# Patient Record
Sex: FEMALE | Race: BLACK OR AFRICAN AMERICAN | Employment: FULL TIME | URBAN - METROPOLITAN AREA
[De-identification: names, ages, dates, MRNs, and addresses within clinical notes are randomized per-mention and may not be internally consistent; named-entity substitution may affect disease eponyms.]

---

## 2017-04-21 DIAGNOSIS — Z00.00 ROUTINE GENERAL MEDICAL EXAMINATION AT A HEALTH CARE FACILITY: Primary | ICD-10-CM

## 2017-04-21 LAB
A-G RATIO,AGRAT: 1.9 RATIO (ref 1.1–2.6)
ALBUMIN SERPL-MCNC: 4.1 G/DL (ref 3.5–5)
ALP SERPL-CCNC: 48 U/L (ref 25–115)
ALT SERPL-CCNC: 13 U/L (ref 5–40)
ANION GAP SERPL CALC-SCNC: 17 MMOL/L
AST SERPL W P-5'-P-CCNC: 17 U/L (ref 10–37)
BILIRUB SERPL-MCNC: <0.1 MG/DL (ref 0.2–1.2)
BUN SERPL-MCNC: 17 MG/DL (ref 6–22)
CALCIUM SERPL-MCNC: 9 MG/DL (ref 8.4–10.5)
CHLORIDE SERPL-SCNC: 98 MMOL/L (ref 98–110)
CHOLEST SERPL-MCNC: 188 MG/DL (ref 110–200)
CO2 SERPL-SCNC: 25 MMOL/L (ref 20–32)
CREAT SERPL-MCNC: 0.9 MG/DL (ref 0.5–1.2)
ERYTHROCYTE [DISTWIDTH] IN BLOOD BY AUTOMATED COUNT: 13.4 % (ref 10–16)
GFRAA, 66117: >60
GFRNA, 66118: >60
GLOBULIN,GLOB: 2.2 G/DL (ref 2–4)
GLUCOSE SERPL-MCNC: 79 MG/DL (ref 65–99)
HCT VFR BLD AUTO: 43.7 % (ref 35.1–48)
HDLC SERPL-MCNC: 75 MG/DL (ref 40–59)
HGB BLD-MCNC: 14.3 G/DL (ref 11.7–16)
LDLC SERPL CALC-MCNC: 100 MG/DL (ref 50–99)
MCH RBC QN AUTO: 35 PG (ref 26–34)
MCHC RBC AUTO-ENTMCNC: 33 G/DL (ref 32–36)
MCV RBC AUTO: 107 FL (ref 80–95)
PLATELET # BLD AUTO: 188 K/UL (ref 140–440)
PMV BLD AUTO: 10.6 FL (ref 6–10.8)
POTASSIUM SERPL-SCNC: 3.6 MMOL/L (ref 3.5–5.5)
PROT SERPL-MCNC: 6.3 G/DL (ref 6.4–8.3)
RBC # BLD AUTO: 4.1 M/UL (ref 3.8–5.2)
SODIUM SERPL-SCNC: 140 MMOL/L (ref 133–145)
TRIGL SERPL-MCNC: 66 MG/DL (ref 40–149)
VLDLC SERPL CALC-MCNC: 13 MG/DL (ref 8–30)
WBC # BLD AUTO: 10.4 K/UL (ref 4–11)

## 2017-04-24 ENCOUNTER — TELEPHONE (OUTPATIENT)
Dept: FAMILY MEDICINE CLINIC | Age: 47
End: 2017-04-24

## 2017-04-24 DIAGNOSIS — D75.89 MACROCYTOSIS WITHOUT ANEMIA: Primary | ICD-10-CM

## 2017-05-01 ENCOUNTER — OFFICE VISIT (OUTPATIENT)
Dept: FAMILY MEDICINE CLINIC | Age: 47
End: 2017-05-01

## 2017-05-01 VITALS
OXYGEN SATURATION: 98 % | RESPIRATION RATE: 16 BRPM | HEIGHT: 64 IN | TEMPERATURE: 98.1 F | HEART RATE: 108 BPM | SYSTOLIC BLOOD PRESSURE: 110 MMHG | WEIGHT: 132 LBS | DIASTOLIC BLOOD PRESSURE: 66 MMHG | BODY MASS INDEX: 22.53 KG/M2

## 2017-05-01 DIAGNOSIS — N76.0 RECURRENT VAGINITIS: ICD-10-CM

## 2017-05-01 DIAGNOSIS — Z00.00 ROUTINE GENERAL MEDICAL EXAMINATION AT A HEALTH CARE FACILITY: Primary | ICD-10-CM

## 2017-05-01 DIAGNOSIS — L50.9 HIVES: ICD-10-CM

## 2017-05-01 DIAGNOSIS — Z01.419 WELL FEMALE EXAM WITH ROUTINE GYNECOLOGICAL EXAM: ICD-10-CM

## 2017-05-01 DIAGNOSIS — Z12.39 SCREENING FOR BREAST CANCER: ICD-10-CM

## 2017-05-01 DIAGNOSIS — Z72.0 TOBACCO USE: ICD-10-CM

## 2017-05-01 RX ORDER — METRONIDAZOLE 7.5 MG/G
1 GEL VAGINAL
Qty: 24 TUBE | Refills: 1 | Status: SHIPPED | OUTPATIENT
Start: 2017-05-01 | End: 2018-06-03 | Stop reason: SDUPTHER

## 2017-05-01 RX ORDER — BUPROPION HYDROCHLORIDE 150 MG/1
150 TABLET ORAL
Qty: 30 TAB | Refills: 0 | Status: SHIPPED | OUTPATIENT
Start: 2017-05-01 | End: 2017-09-02 | Stop reason: SDUPTHER

## 2017-05-01 NOTE — PROGRESS NOTES
Mali Ragland is a 40 y.o. female here today for physical.        1. Have you been to the ER, urgent care clinic since your last visit? Hospitalized since your last visit? No    2. Have you seen or consulted any other health care providers outside of the [address] [place] [address] since your last visit? Include any pap smears or colon screening.  No

## 2017-05-01 NOTE — PATIENT INSTRUCTIONS
Learning About Pap Tests  What is a Pap test?  The Pap test (also called a Pap smear) is a screening test for cancer of the cervix, which is the lower part of the uterus that opens into the vagina. The test can help your doctor find early changes in the cells that could lead to cancer. During the test, the doctor or nurse will insert a tool called a speculum into your vagina. The speculum gently spreads apart the vaginal walls. That allows your doctor to see inside the vagina and the cervix. He or she uses a cotton swab or brush to collect cell samples from your cervix. Try to schedule the test when you're not having your period. To get ready for a Pap test, avoid douches, tampons, vaginal medicines, sprays, or powders for at least a day before you have the test.  When should you have a Pap test?  Women should start having Pap tests at age 24. If you are younger than 24 and are sexually active, it's still a good idea to have regular testing for sexually transmitted infections. · Women 21 to 30 should have Pap tests every 3 years. · Women 30 to 72 may continue to have a Pap test every 3 years as long as their results are normal. Or they can choose to have a Pap test and an HPV test. This is called co-testing. With co-testing, women can have screening every 5 years as long as their test results are normal.  · Women 72 and older may no longer need Pap tests. When to stop having Pap tests depends on your medical history, your overall health, and your risk of cervical cell changes or cervical cancer. Talk with your doctor about whether you should stop or continue to have Pap tests. He or she can help you decide. These recommendations do not apply to women who have had a serious abnormal Pap test result or who have certain health problems. Talk to your doctor about how often you should be tested. There is also a newer test called a primary HPV test. It is used in women ages 22 and older.  And it is done every 3 years, as long as a woman's test results are normal. A woman who has this test doesn't need to have a Pap test.  Having the HPV vaccine does not change your need for screening tests. Women who have had the HPV vaccine should follow the same screening schedules as women who have not had the vaccine. What happens after the test?  The sample of cells taken during your test will be sent to a lab so that an expert can look at the cells. It usually takes a week or two to get the results back. · A normal result means that the test did not find any abnormal cells in the sample. · An abnormal result can mean many things. Most of these are not cancer. The results of your test may be abnormal because:  ¨ You have an infection of the vagina or cervix, such as a yeast infection. ¨ You have an IUD (intrauterine device for birth control). ¨ You have low estrogen levels after menopause that are causing the cells to change. ¨ You have cell changes that may be a sign of precancer or cancer. The results are ranked based on how serious the changes might be. Where can you learn more? Go to http://sneha-carlota.info/. Enter P919 in the search box to learn more about \"Learning About Pap Tests. \"  Current as of: July 26, 2016  Content Version: 11.2  © 4242-6610 [employer], frooly. Care instructions adapted under license by [employer] (which disclaims liability or warranty for this information). If you have questions about a medical condition or this instruction, always ask your healthcare professional. [place] [address] any warranty or liability for your use of this information.

## 2017-05-01 NOTE — PROGRESS NOTES
Assessment/Plan:    1. Routine general medical examination at a health care facility    2. Well female exam with routine gynecological exam  - PAP IG, RFX APTIMA HPV ASCUS ([id number]))    3. Tobacco use- had side effects with NRT and chantix  - buPROPion XL (WELLBUTRIN XL) 150 mg tablet; Take 1 Tab by mouth every morning. Dispense: 30 Tab; Refill: 0    4. Hives  -intol of singulair.  - REFERRAL TO ALLERGY    5. Recurrent vaginitis  -flagyl suppressive rx x 6mos    6. Screening for breast cancer  - [facility] MAMMO BI SCREENING INCL CAD; Future    The plan was discussed with the patient. The patient verbalized understanding and is in agreement with the plan. All medication potential side effects were discussed with the patient. Health Maintenance:   Health Maintenance   Topic Date Due    Pneumococcal 19-64 Medium Risk (1 of 1 - PPSV23) 08/04/1990    DTaP/Tdap/Td series (1 - Tdap) 08/04/1992    PAP AKA CERVICAL CYTOLOGY  12/13/2016    INFLUENZA AGE 9 TO ADULT  08/01/2017     Jumana Castellanos is a 40 y.o. female and presents with Physical     Subjective:  Here for physical.  Feels well. Pt states she has been getting hives. She can't identify a trigger. Is being treated with prednisone by the [facility]. This has been happening since 11/2016. She takes benadryl qhs. Intol of singulair. Pt is interested in stopping smoking. Has used NRT, chantix in the past.  Plans for a quit date of 8/2017. Notes when she has sex, she has fishy odor afterward. She notices it more the week before her menses and the week after. ROS:  Constitutional: No recent weight change. No weakness/fatigue. No f/c. Skin: + rashes, no change in nails/hair, +itching   HENT: No HA, dizziness. No hearing loss/tinnitus. No nasal congestion/discharge. Eyes: No change in vision, double/blurred vision or eye pain/redness. Cardiovascular: No CP/palpitations. No FERRER/orthopnea/PND. Respiratory: No cough/sputum, dyspnea, wheezing. Gastointestinal: No dysphagia, reflux. No n/v. No constipation/diarrhea. No melena/rectal bleeding. Genitourinary: No dysuria, urinary hesitancy, nocturia, hematuria. No incontinence. Musculoskeletal: No joint pain/stiffness. No muscle pain/tenderness. Endo: No heat/cold intolerance, no polyuria/polydypsia. Heme: No h/o anemia. No easy bleeding/bruising. Allergy/Immunology: No seasonal rhinitis. Denies frequent colds, sinus/ear infections. Neurological: No seizures/numbness/weakness. No paresthesias. Psychiatric:  No depression, anxiety. The problem list was updated as a part of today's visit. Patient Active Problem List   Diagnosis Code    Insomnia G47.00    History of shingles Z86.19    Macrocytosis without anemia D75.89       The PSH, FH were reviewed. SH:  Social History   Substance Use Topics    Smoking status: Current Every Day Smoker     Packs/day: 0.50     Years: 23.00    Smokeless tobacco: None    Alcohol use Yes       Medications/Allergies:    Current Outpatient Prescriptions:     acetaminophen (TYLENOL) 325 mg tablet, Take  by mouth every four (4) hours as needed. , Disp: , Rfl:     CETIRIZINE HCL/PSEUDOEPHEDRINE (ZYRTEC-D PO), Take  by mouth., Disp: , Rfl:     ASPIRIN/ACETAMINOPHEN/CAFFEINE (EXCEDRIN MIGRAINE PO), Take  by mouth., Disp: , Rfl:     ibuprofen (MOTRIN) 600 mg tablet, Take  by mouth every six (6) hours as needed. , Disp: , Rfl:        Allergies   Allergen Reactions    Codeine Itching    Compazine [Prochlorperazine] Anaphylaxis    Morphine Nausea and Vomiting    Phenergan [Promethazine] Anaphylaxis       Objective:  Visit Vitals    /66    Pulse (!) 108    Temp 98.1 °F (36.7 °C)    Resp 16    Ht 5' 4\" (1.626 m)    Wt 132 lb (59.9 kg)    LMP 04/11/2017    SpO2 98%    BMI 22.66 kg/m2      Constitutional: Well developed, nourished, no distress, alert   HENT: Exterior ears and tympanic membranes normal bilaterally. Supple neck.  No thyromegaly or lymphadenopathy. Oropharynx clear and moist mucous membranes. Eyes: Conjunctiva normal. PERRL. CV: S1, S2.  RRR. No murmurs/rubs. No thrills palpated. No carotid bruits. Intact distal pulses. No edema. Pulm: No abnormalities on inspection. Clear to auscultation bilaterally. No wheezing/rhonchi. Normal effort. GI: Soft, nontender, nondistended. Normal active bowel sounds. MS: Gait normal.  Joints without deformity/tenderness. Strength intact bilateral upper and lower ext. Normal ROM all extremities. Neuro: A/O x 3. No focal motor or sensory deficits. Speech normal.   Skin: No lesions/rashes on inspection. Psych: Appropriate affect, judgement and insight. Short-term memory intact. Pelvic exam: normal external genitalia, vulva, vagina, cervix, uterus and adnexa. Labwork and Ancillary Studies:    CBC w/Diff  Lab Results   Component Value Date/Time    WBC 10.4 04/21/2017 07:47 AM    HGB 14.3 04/21/2017 07:47 AM    PLATELET 649 52/89/5178 07:47 AM         Basic Metabolic Profile/LFTs  Lab Results   Component Value Date/Time    Sodium 140 04/21/2017 07:47 AM    Potassium 3.6 04/21/2017 07:47 AM    Chloride 98 04/21/2017 07:47 AM    CO2 25 04/21/2017 07:47 AM    Anion gap 17.0 04/21/2017 07:47 AM    Glucose 79 04/21/2017 07:47 AM    BUN 17 04/21/2017 07:47 AM    Creatinine 0.9 04/21/2017 07:47 AM    BUN/Creatinine ratio 17 12/16/2013 12:00 AM    GFR est AA 88 12/16/2013 12:00 AM    GFR est non-AA 76 12/16/2013 12:00 AM    Calcium 9.0 04/21/2017 07:47 AM      Lab Results   Component Value Date/Time    ALT (SGPT) 13 04/21/2017 07:47 AM    AST (SGOT) 17 04/21/2017 07:47 AM    Alk.  phosphatase 48 04/21/2017 07:47 AM    Bilirubin, total <0.1 04/21/2017 07:47 AM       Cholesterol  Lab Results   Component Value Date/Time    Cholesterol, total 188 04/21/2017 07:47 AM    HDL Cholesterol 75 04/21/2017 07:47 AM    LDL, calculated 100 04/21/2017 07:47 AM    Triglyceride 66 04/21/2017 07:47 AM

## 2017-05-01 NOTE — MR AVS SNAPSHOT
Visit Information Date & Time Provider Department Dept. Phone Encounter #  
 5/1/2017  7:30 AM Beck Rosa, 3 [facility] [phone] [id number] Your Appointments 5/4/2017  1:30 PM  
Office Visit with Kiran Lee MD  
3 [facility] [address] [facility]) Appt Note: Pap Smear; rs  
 [address] [facility] [id number] [phone]  
  
   
 [address] Upcoming Health Maintenance Date Due Pneumococcal 19-64 Medium Risk (1 of 1 - PPSV23) 8/4/1990 DTaP/Tdap/Td series (1 - Tdap) 8/4/1992 INFLUENZA AGE 9 TO ADULT 8/1/2017 PAP AKA CERVICAL CYTOLOGY 5/1/2020 Allergies as of 5/1/2017  Review Complete On: 5/1/2017 By: Kiran Lee MD  
  
 Severity Noted Reaction Type Reactions Codeine  03/02/2011    Itching Compazine [Prochlorperazine]  07/03/2012    Anaphylaxis Morphine  03/02/2011    Nausea and Vomiting Phenergan [Promethazine]  03/02/2011    Anaphylaxis Current Immunizations  Never Reviewed Name Date  
 TB Skin Test (PPD) Intradermal 12/13/2013 Not reviewed this visit You Were Diagnosed With   
  
 Codes Comments Routine general medical examination at a health care facility    -  Primary ICD-10-CM: Z00.00 ICD-9-CM: V70.0 Well female exam with routine gynecological exam     ICD-10-CM: X36.857 ICD-9-CM: V72.31 Tobacco use     ICD-10-CM: Z72.0 ICD-9-CM: 305.1 Hives     ICD-10-CM: L50.9 ICD-9-CM: 708. 9 Vitals BP Pulse Temp Resp Height(growth percentile) Weight(growth percentile) 110/66 (!) 108 98.1 °F (36.7 °C) 16 5' 4\" (1.626 m) 132 lb (59.9 kg) LMP SpO2 BMI OB Status Smoking Status 04/11/2017 98% 22.66 kg/m2 Having regular periods Current Every Day Smoker Vitals History BMI and BSA Data Body Mass Index Body Surface Area  
 22.66 kg/m 2 1.64 m 2 Preferred Pharmacy Pharmacy Name Phone [address] [facility], [facility] [address] [phone] Your Updated Medication List  
  
   
This list is accurate as of: 5/1/17  8:06 AM.  Always use your most recent med list.  
  
  
  
  
 buPROPion  mg tablet Commonly known as:  Mitzi Crumbly Take 1 Tab by mouth every morning. EXCEDRIN MIGRAINE PO Take  by mouth. ibuprofen 600 mg tablet Commonly known as:  MOTRIN Take  by mouth every six (6) hours as needed. TYLENOL 325 mg tablet Generic drug:  acetaminophen Take  by mouth every four (4) hours as needed. ZYRTEC-D PO Take  by mouth. Prescriptions Sent to Pharmacy Refills buPROPion XL (WELLBUTRIN XL) 150 mg tablet 0 Sig: Take 1 Tab by mouth every morning. Class: Normal  
 Pharmacy: [facility] [address] [place] [address], [address] [place] [address] AT [address] Ph #: [phone] Route: Oral  
  
We Performed the Following PAP IG, RFX APTIMA HPV ASCUS ([id number])) [[id number] Custom] REFERRAL TO ALLERGY [REF5 Custom] Comments:  
 Please evaluate patient for hives Referral Information Referral ID Referred By Referred To  
  
 [id number] [facility] Not Available Visits Status Start Date End Date 1 New Request 5/1/17 5/1/18 If your referral has a status of pending review or denied, additional information will be sent to support the outcome of this decision. Patient Instructions Learning About Pap Tests What is a Pap test? 
The Pap test (also called a Pap smear) is a screening test for cancer of the cervix, which is the lower part of the uterus that opens into the vagina. The test can help your doctor find early changes in the cells that could lead to cancer.  
During the test, the doctor or nurse will insert a tool called a speculum into your vagina. The speculum gently spreads apart the vaginal walls. That allows your doctor to see inside the vagina and the cervix. He or she uses a cotton swab or brush to collect cell samples from your cervix. Try to schedule the test when you're not having your period. To get ready for a Pap test, avoid douches, tampons, vaginal medicines, sprays, or powders for at least a day before you have the test. 
When should you have a Pap test? 
Women should start having Pap tests at age 24. If you are younger than 24 and are sexually active, it's still a good idea to have regular testing for sexually transmitted infections. · Women 21 to 30 should have Pap tests every 3 years. · Women 30 to 72 may continue to have a Pap test every 3 years as long as their results are normal. Or they can choose to have a Pap test and an HPV test. This is called co-testing. With co-testing, women can have screening every 5 years as long as their test results are normal. 
· Women 72 and older may no longer need Pap tests. When to stop having Pap tests depends on your medical history, your overall health, and your risk of cervical cell changes or cervical cancer. Talk with your doctor about whether you should stop or continue to have Pap tests. He or she can help you decide. These recommendations do not apply to women who have had a serious abnormal Pap test result or who have certain health problems. Talk to your doctor about how often you should be tested. There is also a newer test called a primary HPV test. It is used in women ages 22 and older. And it is done every 3 years, as long as a woman's test results are normal. A woman who has this test doesn't need to have a Pap test. 
Having the HPV vaccine does not change your need for screening tests. Women who have had the HPV vaccine should follow the same screening schedules as women who have not had the vaccine.  
What happens after the test? 
 The sample of cells taken during your test will be sent to a lab so that an expert can look at the cells. It usually takes a week or two to get the results back. · A normal result means that the test did not find any abnormal cells in the sample. · An abnormal result can mean many things. Most of these are not cancer. The results of your test may be abnormal because: 
¨ You have an infection of the vagina or cervix, such as a yeast infection. ¨ You have an IUD (intrauterine device for birth control). ¨ You have low estrogen levels after menopause that are causing the cells to change. ¨ You have cell changes that may be a sign of precancer or cancer. The results are ranked based on how serious the changes might be. Where can you learn more? Go to http://sneha-carlota.info/. Enter P919 in the search box to learn more about \"Learning About Pap Tests. \" Current as of: July 26, 2016 Content Version: 11.2 © 7362-7992 [employer]. Care instructions adapted under license by [employer] (which disclaims liability or warranty for this information). If you have questions about a medical condition or this instruction, always ask your healthcare professional. [address] any warranty or liability for your use of this information. Introducing [facility]! Lyudmila Galicia introduces [employer] patient portal. Now you can access parts of your medical record, email your doctor's office, and request medication refills online. 1. In your internet browser, go to [email or web address] 2. Click on the First Time User? Click Here link in the Sign In box. You will see the New Member Sign Up page. 3. Enter your [employer] Access Code exactly as it appears below. You will not need to use this code after youve completed the sign-up process. If you do not sign up before the expiration date, you must request a new code. · [employer] Access Code: [email or web address] Expires: 7/30/2017  7:34 AM 
 
4. Enter the last four digits of your Social Security Number (xxxx) and Date of Birth (mm/dd/yyyy) as indicated and click Submit. You will be taken to the next sign-up page. 5. Create a [employer] ID. This will be your [employer] login ID and cannot be changed, so think of one that is secure and easy to remember. 6. Create a [employer] password. You can change your password at any time. 7. Enter your Password Reset Question and Answer. This can be used at a later time if you forget your password. 8. Enter your e-mail address. You will receive e-mail notification when new information is available in [address] 9. Click Sign Up. You can now view and download portions of your medical record. 10. Click the Download Summary menu link to download a portable copy of your medical information. If you have questions, please visit the Frequently Asked Questions section of the [employer] website. Remember, [employer] is NOT to be used for urgent needs. For medical emergencies, dial 911. Now available from your iPhone and Android! Please provide this summary of care documentation to your next provider. Your primary care clinician is listed as Manjit 13. If you have any questions after today's visit, please call [phone].

## 2017-05-02 LAB — PAP IMAGE GUIDED, 8900296: NORMAL

## 2017-05-03 RX ORDER — FLUCONAZOLE 150 MG/1
150 TABLET ORAL DAILY
Qty: 1 TAB | Refills: 0 | Status: SHIPPED | OUTPATIENT
Start: 2017-05-03 | End: 2017-05-04

## 2017-05-04 ENCOUNTER — HOSPITAL ENCOUNTER (OUTPATIENT)
Dept: MAMMOGRAPHY | Age: 47
Discharge: HOME | End: 2017-05-04
Attending: INTERNAL MEDICINE
Payer: COMMERCIAL

## 2017-05-04 DIAGNOSIS — Z12.39 SCREENING FOR BREAST CANCER: ICD-10-CM

## 2017-05-04 PROCEDURE — 77067 SCR MAMMO BI INCL CAD: CPT

## 2018-06-04 RX ORDER — METRONIDAZOLE 7.5 MG/G
GEL VAGINAL
Qty: 350000 MG | Refills: 0 | Status: SHIPPED | OUTPATIENT
Start: 2018-06-04 | End: 2018-09-19 | Stop reason: SDUPTHER

## 2018-09-19 ENCOUNTER — OFFICE VISIT (OUTPATIENT)
Dept: FAMILY MEDICINE CLINIC | Age: 48
End: 2018-09-19

## 2018-09-19 VITALS
TEMPERATURE: 98.3 F | BODY MASS INDEX: 25.03 KG/M2 | WEIGHT: 146.6 LBS | HEART RATE: 96 BPM | RESPIRATION RATE: 20 BRPM | OXYGEN SATURATION: 99 % | SYSTOLIC BLOOD PRESSURE: 100 MMHG | HEIGHT: 64 IN | DIASTOLIC BLOOD PRESSURE: 58 MMHG

## 2018-09-19 DIAGNOSIS — Z00.00 ROUTINE GENERAL MEDICAL EXAMINATION AT A HEALTH CARE FACILITY: ICD-10-CM

## 2018-09-19 DIAGNOSIS — K21.9 GASTROESOPHAGEAL REFLUX DISEASE, ESOPHAGITIS PRESENCE NOT SPECIFIED: ICD-10-CM

## 2018-09-19 DIAGNOSIS — D75.89 MACROCYTOSIS WITHOUT ANEMIA: ICD-10-CM

## 2018-09-19 DIAGNOSIS — Z87.19 HISTORY OF GALLSTONES: ICD-10-CM

## 2018-09-19 DIAGNOSIS — R10.11 RUQ ABDOMINAL PAIN: Primary | ICD-10-CM

## 2018-09-19 DIAGNOSIS — E01.0 THYROMEGALY: ICD-10-CM

## 2018-09-19 DIAGNOSIS — Z72.0 TOBACCO USE: ICD-10-CM

## 2018-09-19 DIAGNOSIS — Z23 ENCOUNTER FOR IMMUNIZATION: ICD-10-CM

## 2018-09-19 RX ORDER — METRONIDAZOLE 7.5 MG/G
GEL VAGINAL
Qty: 350000 MG | Refills: 3 | Status: SHIPPED | OUTPATIENT
Start: 2018-09-19

## 2018-09-19 RX ORDER — BUPROPION HYDROCHLORIDE 150 MG/1
TABLET ORAL
Qty: 90 TAB | Refills: 1 | Status: SHIPPED | OUTPATIENT
Start: 2018-09-19 | End: 2019-07-03 | Stop reason: SDUPTHER

## 2018-09-19 RX ORDER — ONDANSETRON 4 MG/1
4 TABLET, ORALLY DISINTEGRATING ORAL
Qty: 30 TAB | Refills: 0 | Status: SHIPPED | OUTPATIENT
Start: 2018-09-19

## 2018-09-19 NOTE — PROGRESS NOTES
Immunization/s administered 9/19/2018 by Kristan Eddy LPN with guardian's consent. Patient tolerated procedure well. No reactions noted. Pneumo 23 right deltoid, Seasonal flu left deltoid

## 2018-09-19 NOTE — PROGRESS NOTES
Mere Wallace is a 53 y.o. female (: 1970) presenting to address: Chief Complaint Patient presents with  Physical  
 
 
Vitals:  
 18 0831 BP: 100/58 Pulse: 96  
Resp: 20 Temp: 98.3 °F (36.8 °C) TempSrc: Oral  
SpO2: 99% Weight: 146 lb 9.6 oz (66.5 kg) Height: 5' 4\" (1.626 m) PainSc:   4 PainLoc: Abdomen LMP: 2018 Hearing/Vision:  
 
 Visual Acuity Screening Right eye Left eye Both eyes Without correction:     
With correction: 20/20 20/15-1 20/15 Learning Assessment:  
 
Learning Assessment 2013 PRIMARY LEARNER Patient PRIMARY LANGUAGE ENGLISH  
LEARNER PREFERENCE PRIMARY PICTURES  
ANSWERED BY Patient RELATIONSHIP SELF Depression Screening: PHQ over the last two weeks 2018 Little interest or pleasure in doing things Not at all Feeling down, depressed, irritable, or hopeless Not at all Total Score PHQ 2 0 Fall Risk Assessment:  
 
Fall Risk Assessment, last 12 mths 2018 Able to walk? Yes Fall in past 12 months? No  
 
Abuse Screening:  
 
Abuse Screening Questionnaire 2018 Do you ever feel afraid of your partner?  Are you in a relationship with someone who physically or mentally threatens you?  Is it safe for you to go home? [facility] Coordination of Care Questionaire: 1. Have you been to the ER, urgent care clinic since your last visit? Hospitalized since your last visit? NO 
 
2. Have you seen or consulted any other health care providers outside of the [address] [place] [address] since your last visit? Include any pap smears or colon screening. YES Advanced Directive: 1. Do you have an Advanced Directive? NO 
 
2. Would you like information on Advanced Directives? NO. Patient refused.

## 2018-09-19 NOTE — PROGRESS NOTES
Assessment/Plan: 1. RUQ abdominal pain and h/o gallstones- will redo u/s and refer to surgery - US ABD LTD; Future 
- REFERRAL TO SURGERY 
-zofran prn 
 
2. Gastroesophageal reflux disease, esophagitis presence not specified 
-cont zantac prn 3. Routine general medical examination at a health care facility - METABOLIC PANEL, COMPREHENSIVE; Future - CBC W/O DIFF; Future - LIPID PANEL; Future 4. Macrocytosis without anemia 
- VITAMIN B12; Future 5. Tobacco use 
-wellbutrin. Counseled on cessation. 
- buPROPion XL (WELLBUTRIN XL) 150 mg tablet; TAKE 1 TABLET BY MOUTH EVERY MORNING  Dispense: 90 Tab; Refill: 1 6. Thyromegaly 
- TSH 3RD GENERATION; Future The plan was discussed with the patient. The patient verbalized understanding and is in agreement with the plan. All medication potential side effects were discussed with the patient. Health Maintenance:  
Health Maintenance Topic Date Due  Pneumococcal 19-64 Medium Risk (1 of 1 - PPSV23) 08/04/1990  
 DTaP/Tdap/Td series (1 - Tdap) 08/04/1992  Influenza Age 5 to Adult  08/01/2018  PAP AKA CERVICAL CYTOLOGY  05/01/2020 Jasen Cherry is a 53 y.o. female and presents with Physical 
  
Subjective: 
Pt here for physical.  Hasn't been seen in 16mos. She also c/o RUQ pain. States a doctor at work did an u/s of abdomen and 'saw a stone'. She also c/o reflux, for which she was taking zantac prn. Eating triggers the pain - worse with greasy or fatty foods. She continues to get BV. Uses metrogel vaginally for suppressive therapy. She continues to smoke. Wants to quit. Had quit previously with wellbutrin, but then was under stress and resumed smoking. ROS: 
Constitutional: No recent weight change. No weakness/fatigue. No f/c. Skin: No rashes, change in nails/hair, itching HENT: No HA, dizziness. No hearing loss/tinnitus. No nasal congestion/discharge. Eyes: No change in vision, double/blurred vision or eye pain/redness. Cardiovascular: No CP/palpitations. No FERRER/orthopnea/PND. Respiratory: No cough/sputum, dyspnea, wheezing. Gastointestinal: No dysphagia, +reflux. +nausea. No constipation/diarrhea. No melena/rectal bleeding. Genitourinary: No dysuria, urinary hesitancy, nocturia, hematuria. No incontinence. Musculoskeletal: No joint pain/stiffness. No muscle pain/tenderness. Endo: No heat/cold intolerance, no polyuria/polydypsia. Heme: No h/o anemia. No easy bleeding/bruising. Allergy/Immunology: No seasonal rhinitis. Denies frequent colds, sinus/ear infections. Neurological: No seizures/numbness/weakness. No paresthesias. Psychiatric:  No depression, anxiety. The problem list was updated as a part of today's visit. Patient Active Problem List  
Diagnosis Code  Insomnia G47.00  
 Macrocytosis without anemia D75.89  Tobacco use Z72.0 The PSH, FH were reviewed. SH: Social History Substance Use Topics  Smoking status: Current Every Day Smoker Packs/day: 0.50 Years: 23.00  Smokeless tobacco: Never Used  Alcohol use Yes Medications/Allergies: 
Current Outpatient Prescriptions on File Prior to Visit Medication Sig Dispense Refill  metroNIDAZOLE (METROGEL) 0.75 % vaginal gel INSERT 1 APPLICATORFUL INTO VAGINA TWICE WEEKLY [id number] mg 0  
 acetaminophen (TYLENOL) 325 mg tablet Take  by mouth every four (4) hours as needed.  CETIRIZINE HCL/PSEUDOEPHEDRINE (ZYRTEC-D PO) Take  by mouth.  ASPIRIN/ACETAMINOPHEN/CAFFEINE (EXCEDRIN MIGRAINE PO) Take  by mouth.  ibuprofen (MOTRIN) 600 mg tablet Take  by mouth every six (6) hours as needed. No current facility-administered medications on file prior to visit. Allergies Allergen Reactions  Codeine Itching  Compazine [Prochlorperazine] Anaphylaxis  Morphine Nausea and Vomiting  Phenergan [Promethazine] Anaphylaxis Objective: 
Visit Vitals  /58 (BP 1 Location: Right arm, BP Patient Position: Sitting)  Pulse 96  Temp 98.3 °F (36.8 °C) (Oral)  Resp 20  
 Ht 5' 4\" (1.626 m)  Wt 146 lb 9.6 oz (66.5 kg)  LMP 08/22/2018  SpO2 99%  BMI 25.16 kg/m2 Constitutional: Well developed, nourished, no distress, alert HENT: Exterior ears and tympanic membranes normal bilaterally. Supple neck. + thyromegaly. No  lymphadenopathy. Oropharynx clear and moist mucous membranes. Eyes: Conjunctiva normal. PERRL. Eyelids normal.  
CV: S1, S2.  RRR. No murmurs/rubs. No thrills palpated. No carotid bruits. Intact distal pulses. No edema. No aortic bruits. Pulm: No abnormalities on inspection. Clear to auscultation bilaterally. No wheezing/rhonchi. Normal effort. GI: Soft, +RUQ and epigastric tenderness, no rebound or guarding, nondistended. Normal active bowel sounds. MS: Gait normal.  Joints without deformity/tenderness. Strength intact bilateral upper and lower ext. Normal ROM all extremities. Neuro: A/O x 3. No focal motor or sensory deficits. Speech normal.  
Skin: No lesions/rashes on inspection. Psych: Appropriate affect, judgement and insight. Short-term memory intact.

## 2018-09-19 NOTE — MR AVS SNAPSHOT
[address] [place] [address]  [address] [facility] [address] [phone] Patient: Edy Caceres MRN: [id number] TKD:[birth year] Visit Information Date & Time Provider Department Dept. Phone Encounter #  
 9/19/2018  8:45 AM Pattie Alston, 3 Kalyn Montero [phone] [id number] Upcoming Health Maintenance Date Due Pneumococcal 19-64 Medium Risk (1 of 1 - PPSV23) 8/4/1990 DTaP/Tdap/Td series (1 - Tdap) 8/4/1992 Influenza Age 5 to Adult 8/1/2018 PAP AKA CERVICAL CYTOLOGY 5/1/2020 Allergies as of 9/19/2018  Review Complete On: 9/19/2018 By: Pattie Alston MD  
  
 Severity Noted Reaction Type Reactions Codeine  03/02/2011    Itching Compazine [Prochlorperazine]  07/03/2012    Anaphylaxis Morphine  03/02/2011    Nausea and Vomiting Phenergan [Promethazine]  03/02/2011    Anaphylaxis Current Immunizations  Never Reviewed Name Date  
 TB Skin Test (PPD) Intradermal 12/13/2013 Not reviewed this visit You Were Diagnosed With   
  
 Codes Comments RUQ abdominal pain    -  Primary ICD-10-CM: R10.11 ICD-9-CM: 789.01 History of gallstones     ICD-10-CM: Z87.19 ICD-9-CM: V12.79 Gastroesophageal reflux disease, esophagitis presence not specified     ICD-10-CM: K21.9 ICD-9-CM: 530.81 Routine general medical examination at a health care facility     ICD-10-CM: Z00.00 ICD-9-CM: V70.0 Macrocytosis without anemia     ICD-10-CM: D75.89 ICD-9-CM: 289.89 Tobacco use     ICD-10-CM: Z72.0 ICD-9-CM: 305.1 Vitals BP Pulse Temp Resp Height(growth percentile) Weight(growth percentile) 100/58 (BP 1 Location: Right arm, BP Patient Position: Sitting) 96 98.3 °F (36.8 °C) (Oral) 20 5' 4\" (1.626 m) 146 lb 9.6 oz (66.5 kg) LMP SpO2 BMI OB Status Smoking Status 08/22/2018 99% 25.16 kg/m2 Having regular periods Current Every Day Smoker Vitals History BMI and BSA Data Body Mass Index Body Surface Area  
 25.16 kg/m 2 1.73 m 2 Preferred Pharmacy Pharmacy Name Phone [address], [address] [facility] [address] [phone] Your Updated Medication List  
  
   
This list is accurate as of 9/19/18  8:53 AM.  Always use your most recent med list.  
  
  
  
  
 buPROPion  mg tablet Commonly known as:  WELLBUTRIN XL  
TAKE 1 TABLET BY MOUTH EVERY MORNING  
  
 EXCEDRIN MIGRAINE PO Take  by mouth. ibuprofen 600 mg tablet Commonly known as:  MOTRIN Take  by mouth every six (6) hours as needed. metroNIDAZOLE 0.75 % vaginal gel Commonly known as:  Gerrianne Garbe INSERT 1 APPLICATORFUL INTO VAGINA TWICE WEEKLY  
  
 ondansetron 4 mg disintegrating tablet Commonly known as:  ZOFRAN ODT Take 1 Tab by mouth every eight (8) hours as needed for Nausea. TYLENOL 325 mg tablet Generic drug:  acetaminophen Take  by mouth every four (4) hours as needed. ZYRTEC-D PO Take  by mouth. Prescriptions Sent to Pharmacy Refills  
 metroNIDAZOLE (METROGEL) 0.75 % vaginal gel 3 Sig: INSERT 1 APPLICATORFUL INTO VAGINA TWICE WEEKLY Class: Normal  
 Pharmacy: [facility] [address] AT [address] Ph #: [phone]  
 buPROPion XL (WELLBUTRIN XL) 150 mg tablet 1 Sig: TAKE 1 TABLET BY MOUTH EVERY MORNING Class: Normal  
 Pharmacy: [facility] [address] AT [address] Ph #: [phone]  
 ondansetron (ZOFRAN ODT) 4 mg disintegrating tablet 0 Sig: Take 1 Tab by mouth every eight (8) hours as needed for Nausea. Class: Normal  
 Pharmacy: [facility] [facility], [address] AT [address] Ph #: [phone]  Route: Oral  
  
 We Performed the Following REFERRAL TO SURGERY [TEU927 Custom] To-Do List   
 09/19/2018 Lab:  CBC W/O DIFF   
  
 09/19/2018 Lab:  LIPID PANEL   
  
 09/19/2018 Lab:  METABOLIC PANEL, COMPREHENSIVE   
  
 09/19/2018 Imaging:  US ABD LTD   
  
 09/19/2018 Lab:  VITAMIN B12 Referral Information Referral ID Referred By Referred To  
  
 [id number], [place] Adalgisa Garnett [address] [place], [address] Phone: [phone] Fax: [phone] Visits Status Start Date End Date 1 New Request 9/19/18 9/19/19 If your referral has a status of pending review or denied, additional information will be sent to support the outcome of this decision. Patient Instructions Learning About Benefits From Quitting Smoking How does quitting smoking make you healthier? If you're thinking about quitting smoking, you may have a few reasons to be smoke-free. Your health may be one of them. · When you quit smoking, you lower your risks for cancer, lung disease, heart attack, stroke, blood vessel disease, and blindness from macular degeneration. · When you're smoke-free, you get sick less often, and you heal faster. You are less likely to get colds, flu, bronchitis, and pneumonia. · As a nonsmoker, you may find that your mood is better and you are less stressed. When and how will you feel healthier? Quitting has real health benefits that start from day 1 of being smoke-free. And the longer you stay smoke-free, the healthier you get and the better you feel. The first hours · After just 20 minutes, your blood pressure and heart rate go down. That means there's less stress on your heart and blood vessels. · Within 12 hours, the level of carbon monoxide in your blood drops back to normal. That makes room for more oxygen.  With more oxygen in your body, you may notice that you have more energy than when you smoked. After 2 weeks · Your lungs start to work better. · Your risk of heart attack starts to drop. After 1 month · When your lungs are clear, you cough less and breathe deeper, so it's easier to be active. · Your sense of taste and smell return. That means you can enjoy food more than you have since you started smoking. Over the years · After 1 year, your risk of heart disease is half what it would be if you kept smoking. · After 5 years, your risk of stroke starts to shrink. Within a few years after that, it's about the same as if you'd never smoked. · After 10 years, your risk of dying from lung cancer is cut by about half. And your risk for many other types of cancer is lower too. How would quitting help others in your life? When you quit smoking, you improve the health of everyone who now breathes in your smoke. · Their heart, lung, and cancer risks drop, much like yours. · They are sick less. For babies and small children, living smoke-free means they're less likely to have ear infections, pneumonia, and bronchitis. · If you're a woman who is or will be pregnant someday, quitting smoking means a healthier . · Children who are close to you are less likely to become adult smokers. Where can you learn more? Go to http://sneha-carlota.info/. Enter [id number] [id number] [id number] 11 in the search box to learn more about \"Learning About Benefits From Quitting Smoking. \" Current as of: 2017 Content Version: 11.7 © 0026-8450 [employer]. Care instructions adapted under license by [employer] (which disclaims liability or warranty for this information). If you have questions about a medical condition or this instruction, always ask your healthcare professional. [place] [address] any warranty or liability for your use of this information. Introducing [facility]! Dear Willa Gilford: Thank you for requesting a IPDIA account. Our records indicate that you already have an active IPDIA account. You can access your account anytime at [email or web address] Did you know that you can access your hospital and ER discharge instructions at any time in IPDIA? You can also review all of your test results from your hospital stay or ER visit. Additional Information If you have questions, please visit the Frequently Asked Questions section of the IPDIA website at [email or web address] Remember, IPDIA is NOT to be used for urgent needs. For medical emergencies, dial 911. Now available from your iPhone and Android! Please provide this summary of care documentation to your next provider. Your primary care clinician is listed as Manjit Onofre. If you have any questions after today's visit, please call [phone].

## 2018-09-19 NOTE — PATIENT INSTRUCTIONS

## 2018-09-20 LAB
A-G RATIO,AGRAT: 2 RATIO (ref 1.1–2.6)
ALBUMIN SERPL-MCNC: 3.8 G/DL (ref 3.5–5)
ALP SERPL-CCNC: 48 U/L (ref 25–115)
ALT SERPL-CCNC: 14 U/L (ref 5–40)
ANION GAP SERPL CALC-SCNC: 16 MMOL/L
AST SERPL W P-5'-P-CCNC: 14 U/L (ref 10–37)
BILIRUB SERPL-MCNC: 0.2 MG/DL (ref 0.2–1.2)
BUN SERPL-MCNC: 14 MG/DL (ref 6–22)
CALCIUM SERPL-MCNC: 8.7 MG/DL (ref 8.4–10.5)
CHLORIDE SERPL-SCNC: 104 MMOL/L (ref 98–110)
CHOLEST SERPL-MCNC: 176 MG/DL (ref 110–200)
CO2 SERPL-SCNC: 22 MMOL/L (ref 20–32)
CREAT SERPL-MCNC: 0.9 MG/DL (ref 0.5–1.2)
ERYTHROCYTE [DISTWIDTH] IN BLOOD BY AUTOMATED COUNT: 13.1 % (ref 10–15.5)
GFRAA, 66117: >60
GFRNA, 66118: >60
GLOBULIN,GLOB: 1.9 G/DL (ref 2–4)
GLUCOSE SERPL-MCNC: 81 MG/DL (ref 70–99)
HCT VFR BLD AUTO: 44.3 % (ref 35.1–48)
HDLC SERPL-MCNC: 3.3 MG/DL (ref 0–5)
HDLC SERPL-MCNC: 54 MG/DL (ref 40–59)
HGB BLD-MCNC: 14.9 G/DL (ref 11.7–16)
LDLC SERPL CALC-MCNC: 108 MG/DL (ref 50–99)
MCH RBC QN AUTO: 35 PG (ref 26–34)
MCHC RBC AUTO-ENTMCNC: 34 G/DL (ref 31–36)
MCV RBC AUTO: 105 FL (ref 80–95)
PLATELET # BLD AUTO: 194 K/UL (ref 140–440)
PMV BLD AUTO: 10.3 FL (ref 9–13)
POTASSIUM SERPL-SCNC: 3.8 MMOL/L (ref 3.5–5.5)
PROT SERPL-MCNC: 5.7 G/DL (ref 6.4–8.3)
RBC # BLD AUTO: 4.22 M/UL (ref 3.8–5.2)
SODIUM SERPL-SCNC: 142 MMOL/L (ref 133–145)
T3FREE SERPL-MCNC: 2.9 PG/ML (ref 2.3–4.2)
TRIGL SERPL-MCNC: 68 MG/DL (ref 40–149)
VIT B12 SERPL-MCNC: 500 PG/ML (ref 211–911)
VLDLC SERPL CALC-MCNC: 14 MG/DL (ref 8–30)
WBC # BLD AUTO: 7.3 K/UL (ref 4–11)

## 2018-09-24 DIAGNOSIS — Z12.39 SCREENING FOR BREAST CANCER: Primary | ICD-10-CM

## 2018-09-26 ENCOUNTER — HOSPITAL ENCOUNTER (OUTPATIENT)
Dept: MAMMOGRAPHY | Age: 48
Discharge: HOME | End: 2018-09-26
Attending: INTERNAL MEDICINE
Payer: COMMERCIAL

## 2018-09-26 ENCOUNTER — HOSPITAL ENCOUNTER (OUTPATIENT)
Dept: ULTRASOUND IMAGING | Age: 48
Discharge: HOME | End: 2018-09-26
Attending: INTERNAL MEDICINE
Payer: COMMERCIAL

## 2018-09-26 DIAGNOSIS — Z12.39 SCREENING FOR BREAST CANCER: ICD-10-CM

## 2018-09-26 DIAGNOSIS — R10.11 RUQ ABDOMINAL PAIN: ICD-10-CM

## 2018-09-26 DIAGNOSIS — Z87.19 HISTORY OF GALLSTONES: ICD-10-CM

## 2018-09-26 PROCEDURE — 77067 SCR MAMMO BI INCL CAD: CPT

## 2018-09-26 PROCEDURE — 76705 ECHO EXAM OF ABDOMEN: CPT

## 2018-09-28 ENCOUNTER — HOSPITAL ENCOUNTER (OUTPATIENT)
Dept: MAMMOGRAPHY | Age: 48
Discharge: HOME | End: 2018-09-28
Attending: INTERNAL MEDICINE
Payer: COMMERCIAL

## 2018-09-28 ENCOUNTER — HOSPITAL ENCOUNTER (OUTPATIENT)
Dept: NUCLEAR MEDICINE | Age: 48
Discharge: HOME | End: 2018-09-28
Attending: INTERNAL MEDICINE
Payer: COMMERCIAL

## 2018-09-28 VITALS — WEIGHT: 146.19 LBS | BODY MASS INDEX: 25.09 KG/M2

## 2018-09-28 DIAGNOSIS — R10.11 RUQ ABDOMINAL PAIN: ICD-10-CM

## 2018-09-28 DIAGNOSIS — R92.8 ABNORMALITY OF LEFT BREAST ON SCREENING MAMMOGRAM: ICD-10-CM

## 2018-09-28 DIAGNOSIS — R92.1 BREAST CALCIFICATIONS ON MAMMOGRAM: ICD-10-CM

## 2018-09-28 PROCEDURE — 74011250636 HC RX REV CODE- 250/636: Performed by: INTERNAL MEDICINE

## 2018-09-28 PROCEDURE — 77065 DX MAMMO INCL CAD UNI: CPT

## 2018-09-28 PROCEDURE — 74011000250 HC RX REV CODE- 250: Performed by: INTERNAL MEDICINE

## 2018-09-28 PROCEDURE — 78227 HEPATOBIL SYST IMAGE W/DRUG: CPT

## 2018-09-28 RX ORDER — WATER FOR INJECTION,STERILE
VIAL (ML) INJECTION
Status: COMPLETED | OUTPATIENT
Start: 2018-09-28 | End: 2018-09-28

## 2018-09-28 RX ADMIN — SINCALIDE 1.33 MCG: 5 INJECTION, POWDER, LYOPHILIZED, FOR SOLUTION INTRAVENOUS at 11:23

## 2018-09-28 RX ADMIN — WATER 1 ML: 1 INJECTION INTRAMUSCULAR; INTRAVENOUS; SUBCUTANEOUS at 11:23

## 2018-10-03 ENCOUNTER — OFFICE VISIT (OUTPATIENT)
Dept: SURGERY | Age: 48
End: 2018-10-03

## 2018-10-03 VITALS
WEIGHT: 149 LBS | TEMPERATURE: 97.6 F | SYSTOLIC BLOOD PRESSURE: 105 MMHG | RESPIRATION RATE: 18 BRPM | OXYGEN SATURATION: 99 % | BODY MASS INDEX: 26.4 KG/M2 | HEIGHT: 63 IN | DIASTOLIC BLOOD PRESSURE: 58 MMHG | HEART RATE: 84 BPM

## 2018-10-03 DIAGNOSIS — K81.1 CHRONIC CHOLECYSTITIS: Primary | ICD-10-CM

## 2018-10-03 RX ORDER — SODIUM CHLORIDE 0.9 % (FLUSH) 0.9 %
5-10 SYRINGE (ML) INJECTION AS NEEDED
Status: CANCELLED | OUTPATIENT
Start: 2018-10-03

## 2018-10-03 RX ORDER — PANTOPRAZOLE SODIUM 40 MG/1
40 TABLET, DELAYED RELEASE ORAL DAILY
Qty: 30 TAB | Refills: 0 | Status: SHIPPED | OUTPATIENT
Start: 2018-10-03 | End: 2018-11-04 | Stop reason: SDUPTHER

## 2018-10-03 RX ORDER — LEVOFLOXACIN 500 MG/1
500 TABLET, FILM COATED ORAL DAILY
Qty: 14 TAB | Refills: 1 | Status: SHIPPED | OUTPATIENT
Start: 2018-10-03 | End: 2018-11-12

## 2018-10-03 RX ORDER — METRONIDAZOLE 500 MG/1
500 TABLET ORAL 3 TIMES DAILY
Qty: 42 TAB | Refills: 1 | Status: SHIPPED | OUTPATIENT
Start: 2018-10-03 | End: 2018-11-12

## 2018-10-03 RX ORDER — SODIUM CHLORIDE 0.9 % (FLUSH) 0.9 %
5-10 SYRINGE (ML) INJECTION EVERY 8 HOURS
Status: CANCELLED | OUTPATIENT
Start: 2018-10-03

## 2018-10-03 NOTE — PROGRESS NOTES
HISTORY OF PRESENT ILLNESS  Shanon Marques is a 53 y.o. female. HPI    Review of Systems   Constitutional: Negative. HENT: Negative. Respiratory: Positive for cough. Negative for hemoptysis, sputum production, shortness of breath and wheezing. Cardiovascular: Negative. Gastrointestinal: Positive for abdominal pain, heartburn, nausea and vomiting. Negative for blood in stool, constipation, diarrhea and melena. Genitourinary: Negative. Musculoskeletal: Positive for back pain, joint pain and neck pain. Negative for falls and myalgias. Skin: Negative. Neurological: Negative. Endo/Heme/Allergies: Negative. Psychiatric/Behavioral: Negative.         Physical Exam    ASSESSMENT and PLAN

## 2018-10-03 NOTE — PROGRESS NOTES
[address] [facility]  General Surgery    Subjective:      HPI: Patient is a very pleasant 50-year-old female with a past medical history remarkable for headaches, tobacco usage and shingles. She is referred by Dr. Kalin Roberts for evaluation and management of biliary colic. She gives a history of 6 months of gastroesophageal reflux disease for which she takes Zantac over-the-counter. Over the past 3 months the reflux has gone from the intermittent to constant with everything that she eats and drinks including water. She experiences right upper quadrant pain with eating greasy foods. She admits that she does not eat 5 servings of fruits and vegetables per day and has some room for improvement in her diet. She has had nausea accompanying the pain and the pain does sometimes radiate into the right shoulder area. Patient Active Problem List    Diagnosis Date Noted    Tobacco use 05/01/2017    Macrocytosis without anemia 04/24/2017    Insomnia 03/02/2011     Past Medical History:   Diagnosis Date    Calcification of right breast     Gyn exam     Dr. Rosi CALLAWAY([id number])     Shingles       Past Surgical History:   Procedure Laterality Date    HX ADENOIDECTOMY      HX ORTHOPAEDIC      HX TONSILLECTOMY      HX TUBAL LIGATION        Family History   Problem Relation Age of Onset    Breast Cancer Maternal Aunt 64    Breast Cancer Paternal Grandmother 79      Social History   Substance Use Topics    Smoking status: Current Every Day Smoker     Packs/day: 0.50     Years: 23.00    Smokeless tobacco: Never Used    Alcohol use Yes      Allergies   Allergen Reactions    Codeine Itching    Compazine [Prochlorperazine] Anaphylaxis    Morphine Nausea and Vomiting    Phenergan [Promethazine] Anaphylaxis       Prior to Admission medications    Medication Sig Start Date End Date Taking?  Authorizing Provider   metroNIDAZOLE (METROGEL) 0.75 % vaginal gel INSERT 1 APPLICATORFUL INTO VAGINA TWICE WEEKLY 9/19/18  Yes Iveth Murphy MD   buPROPion XL (WELLBUTRIN XL) 150 mg tablet TAKE 1 TABLET BY MOUTH EVERY MORNING 9/19/18  Yes Iveth Murphy MD   ondansetron (ZOFRAN ODT) 4 mg disintegrating tablet Take 1 Tab by mouth every eight (8) hours as needed for Nausea. 9/19/18  Yes Iveth Murphy MD   CETIRIZINE HCL/PSEUDOEPHEDRINE (ZYRTEC-D PO) Take  by mouth. Yes Historical Provider   ASPIRIN/ACETAMINOPHEN/CAFFEINE (EXCEDRIN MIGRAINE PO) Take  by mouth. Yes Historical Provider   ibuprofen (MOTRIN) 600 mg tablet Take  by mouth every six (6) hours as needed. Yes Historical Provider   acetaminophen (TYLENOL) 325 mg tablet Take  by mouth every four (4) hours as needed. Historical Provider       Review of Systems:    14 systems were reviewed. The results are as above in the HPI and otherwise negative. Objective:     Vitals:    10/03/18 0853   BP: 105/58   Pulse: 84   Resp: 18   Temp: 97.6 °F (36.4 °C)   TempSrc: Oral   SpO2: 99%   Weight: 67.6 kg (149 lb)   Height: 5' 3.25\" (1.607 m)       Physical Exam:  GENERAL: alert, cooperative, no distress, appears stated age,   EYE: conjunctivae/corneas clear. PERRL, EOM's intact. THROAT & NECK: normal and no erythema or exudates noted. ,    LYMPHATIC: Cervical, supraclavicular, and axillary nodes normal. ,   LUNG: clear to auscultation bilaterally,   HEART: regular rate and rhythm, S1, S2 normal, no murmur, click, rub or gallop,   ABDOMEN: soft, non-distended, right upper quadrant tenderness with positive Biggs sign. Bowel sounds normal. No masses,  no organomegaly,   EXTREMITIES:  extremities normal, atraumatic, no cyanosis or edema,   SKIN: Normal.,   NEUROLOGIC: AOx3. Cranial nerves 2-12 and sensation grossly intact. ,     Data Review:  to be done    Ms. Concepcion Lawson has a reminder for a \"due or due soon\" health maintenance. I have asked that she contact her primary care provider for follow-up on this health maintenance.   Impression:     ·    Patient with chronic cholecystitis.     Plan:     · Single site robot-assisted laparoscopic cholecystectomy  · Consent on chart  · Preoperative orders written    Signed By: Tristen Rodríguez MD     October 3, 2018

## 2018-11-06 RX ORDER — PANTOPRAZOLE SODIUM 40 MG/1
TABLET, DELAYED RELEASE ORAL
Qty: 30 TAB | Refills: 0 | Status: SHIPPED | OUTPATIENT
Start: 2018-11-06

## 2018-11-09 ENCOUNTER — ANESTHESIA EVENT (OUTPATIENT)
Dept: SURGERY | Age: 48
End: 2018-11-09
Payer: COMMERCIAL

## 2018-11-12 ENCOUNTER — ANESTHESIA (OUTPATIENT)
Dept: SURGERY | Age: 48
End: 2018-11-12
Payer: COMMERCIAL

## 2018-11-12 ENCOUNTER — HOSPITAL ENCOUNTER (OUTPATIENT)
Age: 48
Setting detail: OUTPATIENT SURGERY
Discharge: HOME | End: 2018-11-12
Attending: SURGERY | Admitting: SURGERY
Payer: COMMERCIAL

## 2018-11-12 VITALS
WEIGHT: 140.8 LBS | OXYGEN SATURATION: 100 % | BODY MASS INDEX: 24.95 KG/M2 | HEIGHT: 63 IN | SYSTOLIC BLOOD PRESSURE: 113 MMHG | RESPIRATION RATE: 14 BRPM | DIASTOLIC BLOOD PRESSURE: 76 MMHG | TEMPERATURE: 96.7 F | HEART RATE: 78 BPM

## 2018-11-12 DIAGNOSIS — G89.18 POSTOPERATIVE PAIN: Primary | ICD-10-CM

## 2018-11-12 LAB — HCG UR QL: NEGATIVE

## 2018-11-12 PROCEDURE — 76210000016 HC OR PH I REC 1 TO 1.5 HR: Performed by: SURGERY

## 2018-11-12 PROCEDURE — 77030008771 HC TU NG SALEM SUMP -A: Performed by: ANESTHESIOLOGY

## 2018-11-12 PROCEDURE — 77030003028 HC SUT VCRL J&J -A: Performed by: SURGERY

## 2018-11-12 PROCEDURE — 81025 URINE PREGNANCY TEST: CPT

## 2018-11-12 PROCEDURE — 77030026438 HC STYL ET INTUB CARD -A: Performed by: ANESTHESIOLOGY

## 2018-11-12 PROCEDURE — 77030008683 HC TU ET CUF COVD -A: Performed by: ANESTHESIOLOGY

## 2018-11-12 PROCEDURE — 77030018836 HC SOL IRR NACL ICUM -A: Performed by: SURGERY

## 2018-11-12 PROCEDURE — 76210000021 HC REC RM PH II 0.5 TO 1 HR: Performed by: SURGERY

## 2018-11-12 PROCEDURE — 77030031139 HC SUT VCRL2 J&J -A: Performed by: SURGERY

## 2018-11-12 PROCEDURE — 77030010939 HC CLP LIG TELE -B: Performed by: SURGERY

## 2018-11-12 PROCEDURE — 74011250636 HC RX REV CODE- 250/636

## 2018-11-12 PROCEDURE — 76060000033 HC ANESTHESIA 1 TO 1.5 HR: Performed by: SURGERY

## 2018-11-12 PROCEDURE — 74011250636 HC RX REV CODE- 250/636: Performed by: ANESTHESIOLOGY

## 2018-11-12 PROCEDURE — 77030002966 HC SUT PDS J&J -A: Performed by: SURGERY

## 2018-11-12 PROCEDURE — 74011250636 HC RX REV CODE- 250/636: Performed by: NURSE ANESTHETIST, CERTIFIED REGISTERED

## 2018-11-12 PROCEDURE — 74011000254 HC RX REV CODE- 254: Performed by: SURGERY

## 2018-11-12 PROCEDURE — 77030032490 HC SLV COMPR SCD KNE COVD -B: Performed by: SURGERY

## 2018-11-12 PROCEDURE — 77030035488 HC SEAL UNIV DISP INTU -C: Performed by: SURGERY

## 2018-11-12 PROCEDURE — 74011000250 HC RX REV CODE- 250: Performed by: SURGERY

## 2018-11-12 PROCEDURE — 77030002933 HC SUT MCRYL J&J -A: Performed by: SURGERY

## 2018-11-12 PROCEDURE — 77030029216 HC PRT SGL SITE DAVINCI INTU -C: Performed by: SURGERY

## 2018-11-12 PROCEDURE — 74011000258 HC RX REV CODE- 258: Performed by: SURGERY

## 2018-11-12 PROCEDURE — 77030020782 HC GWN BAIR PAWS FLX 3M -B: Performed by: SURGERY

## 2018-11-12 PROCEDURE — 88304 TISSUE EXAM BY PATHOLOGIST: CPT

## 2018-11-12 PROCEDURE — 77030019605: Performed by: SURGERY

## 2018-11-12 PROCEDURE — 74011000250 HC RX REV CODE- 250

## 2018-11-12 PROCEDURE — 74011250637 HC RX REV CODE- 250/637: Performed by: NURSE ANESTHETIST, CERTIFIED REGISTERED

## 2018-11-12 PROCEDURE — 76010000934 HC OR TIME 1 TO 1.5HR INTENSV - TIER 2: Performed by: SURGERY

## 2018-11-12 RX ORDER — HALOPERIDOL 5 MG/ML
1 INJECTION INTRAMUSCULAR ONCE
Status: COMPLETED | OUTPATIENT
Start: 2018-11-12 | End: 2018-11-12

## 2018-11-12 RX ORDER — ROCURONIUM BROMIDE 10 MG/ML
INJECTION, SOLUTION INTRAVENOUS AS NEEDED
Status: DISCONTINUED | OUTPATIENT
Start: 2018-11-12 | End: 2018-11-12 | Stop reason: HOSPADM

## 2018-11-12 RX ORDER — IBUPROFEN 800 MG/1
800 TABLET ORAL
Qty: 40 TAB | Refills: 0 | Status: SHIPPED | OUTPATIENT
Start: 2018-11-12

## 2018-11-12 RX ORDER — SODIUM CHLORIDE, SODIUM LACTATE, POTASSIUM CHLORIDE, CALCIUM CHLORIDE 600; 310; 30; 20 MG/100ML; MG/100ML; MG/100ML; MG/100ML
75 INJECTION, SOLUTION INTRAVENOUS CONTINUOUS
Status: DISCONTINUED | OUTPATIENT
Start: 2018-11-12 | End: 2018-11-12 | Stop reason: HOSPADM

## 2018-11-12 RX ORDER — HYDROMORPHONE HYDROCHLORIDE 4 MG/1
4 TABLET ORAL
Status: DISCONTINUED | OUTPATIENT
Start: 2018-11-12 | End: 2018-11-12 | Stop reason: HOSPADM

## 2018-11-12 RX ORDER — DEXAMETHASONE SODIUM PHOSPHATE 4 MG/ML
INJECTION, SOLUTION INTRA-ARTICULAR; INTRALESIONAL; INTRAMUSCULAR; INTRAVENOUS; SOFT TISSUE AS NEEDED
Status: DISCONTINUED | OUTPATIENT
Start: 2018-11-12 | End: 2018-11-12 | Stop reason: HOSPADM

## 2018-11-12 RX ORDER — EPHEDRINE SULFATE 50 MG/ML
INJECTION, SOLUTION INTRAVENOUS AS NEEDED
Status: DISCONTINUED | OUTPATIENT
Start: 2018-11-12 | End: 2018-11-12 | Stop reason: HOSPADM

## 2018-11-12 RX ORDER — LIDOCAINE HYDROCHLORIDE 20 MG/ML
INJECTION, SOLUTION EPIDURAL; INFILTRATION; INTRACAUDAL; PERINEURAL AS NEEDED
Status: DISCONTINUED | OUTPATIENT
Start: 2018-11-12 | End: 2018-11-12 | Stop reason: HOSPADM

## 2018-11-12 RX ORDER — SUCCINYLCHOLINE CHLORIDE 20 MG/ML
INJECTION INTRAMUSCULAR; INTRAVENOUS AS NEEDED
Status: DISCONTINUED | OUTPATIENT
Start: 2018-11-12 | End: 2018-11-12 | Stop reason: HOSPADM

## 2018-11-12 RX ORDER — HYDROMORPHONE HYDROCHLORIDE 4 MG/1
4 TABLET ORAL
Qty: 40 TAB | Refills: 0 | Status: SHIPPED | OUTPATIENT
Start: 2018-11-12

## 2018-11-12 RX ORDER — SODIUM CHLORIDE, SODIUM LACTATE, POTASSIUM CHLORIDE, CALCIUM CHLORIDE 600; 310; 30; 20 MG/100ML; MG/100ML; MG/100ML; MG/100ML
25 INJECTION, SOLUTION INTRAVENOUS CONTINUOUS
Status: DISCONTINUED | OUTPATIENT
Start: 2018-11-12 | End: 2018-11-12 | Stop reason: HOSPADM

## 2018-11-12 RX ORDER — FAMOTIDINE 20 MG/1
20 TABLET, FILM COATED ORAL ONCE
Status: COMPLETED | OUTPATIENT
Start: 2018-11-12 | End: 2018-11-12

## 2018-11-12 RX ORDER — INSULIN LISPRO 100 [IU]/ML
INJECTION, SOLUTION INTRAVENOUS; SUBCUTANEOUS ONCE
Status: DISCONTINUED | OUTPATIENT
Start: 2018-11-12 | End: 2018-11-12 | Stop reason: HOSPADM

## 2018-11-12 RX ORDER — DIPHENHYDRAMINE HCL 25 MG
25 TABLET ORAL
COMMUNITY

## 2018-11-12 RX ORDER — PROPOFOL 10 MG/ML
INJECTION, EMULSION INTRAVENOUS AS NEEDED
Status: DISCONTINUED | OUTPATIENT
Start: 2018-11-12 | End: 2018-11-12 | Stop reason: HOSPADM

## 2018-11-12 RX ORDER — INDOCYANINE GREEN AND WATER 25 MG
3 KIT INJECTION
Status: COMPLETED | OUTPATIENT
Start: 2018-11-12 | End: 2018-11-12

## 2018-11-12 RX ORDER — DEXTROSE 50 % IN WATER (D50W) INTRAVENOUS SYRINGE
25-50 AS NEEDED
Status: DISCONTINUED | OUTPATIENT
Start: 2018-11-12 | End: 2018-11-12 | Stop reason: HOSPADM

## 2018-11-12 RX ORDER — KETOROLAC TROMETHAMINE 30 MG/ML
INJECTION, SOLUTION INTRAMUSCULAR; INTRAVENOUS AS NEEDED
Status: DISCONTINUED | OUTPATIENT
Start: 2018-11-12 | End: 2018-11-12 | Stop reason: HOSPADM

## 2018-11-12 RX ORDER — NEOSTIGMINE METHYLSULFATE 5 MG/5 ML
SYRINGE (ML) INTRAVENOUS AS NEEDED
Status: DISCONTINUED | OUTPATIENT
Start: 2018-11-12 | End: 2018-11-12 | Stop reason: HOSPADM

## 2018-11-12 RX ORDER — FENTANYL CITRATE 50 UG/ML
50 INJECTION, SOLUTION INTRAMUSCULAR; INTRAVENOUS AS NEEDED
Status: DISCONTINUED | OUTPATIENT
Start: 2018-11-12 | End: 2018-11-12 | Stop reason: HOSPADM

## 2018-11-12 RX ORDER — ONDANSETRON 2 MG/ML
4 INJECTION INTRAMUSCULAR; INTRAVENOUS ONCE
Status: COMPLETED | OUTPATIENT
Start: 2018-11-12 | End: 2018-11-12

## 2018-11-12 RX ORDER — NALOXONE HYDROCHLORIDE 0.4 MG/ML
0.1 INJECTION, SOLUTION INTRAMUSCULAR; INTRAVENOUS; SUBCUTANEOUS ONCE
Status: DISCONTINUED | OUTPATIENT
Start: 2018-11-12 | End: 2018-11-12 | Stop reason: HOSPADM

## 2018-11-12 RX ORDER — GLYCOPYRROLATE 0.2 MG/ML
INJECTION INTRAMUSCULAR; INTRAVENOUS AS NEEDED
Status: DISCONTINUED | OUTPATIENT
Start: 2018-11-12 | End: 2018-11-12 | Stop reason: HOSPADM

## 2018-11-12 RX ORDER — MAGNESIUM SULFATE 100 %
4 CRYSTALS MISCELLANEOUS AS NEEDED
Status: DISCONTINUED | OUTPATIENT
Start: 2018-11-12 | End: 2018-11-12 | Stop reason: HOSPADM

## 2018-11-12 RX ORDER — DOCUSATE SODIUM 100 MG/1
100 CAPSULE, LIQUID FILLED ORAL 2 TIMES DAILY
Qty: 60 CAP | Refills: 2 | Status: SHIPPED | OUTPATIENT
Start: 2018-11-12 | End: 2019-02-10

## 2018-11-12 RX ORDER — ONDANSETRON 2 MG/ML
INJECTION INTRAMUSCULAR; INTRAVENOUS AS NEEDED
Status: DISCONTINUED | OUTPATIENT
Start: 2018-11-12 | End: 2018-11-12 | Stop reason: HOSPADM

## 2018-11-12 RX ORDER — BUPIVACAINE HYDROCHLORIDE AND EPINEPHRINE 2.5; 5 MG/ML; UG/ML
INJECTION, SOLUTION EPIDURAL; INFILTRATION; INTRACAUDAL; PERINEURAL AS NEEDED
Status: DISCONTINUED | OUTPATIENT
Start: 2018-11-12 | End: 2018-11-12 | Stop reason: HOSPADM

## 2018-11-12 RX ORDER — MIDAZOLAM HYDROCHLORIDE 1 MG/ML
INJECTION, SOLUTION INTRAMUSCULAR; INTRAVENOUS AS NEEDED
Status: DISCONTINUED | OUTPATIENT
Start: 2018-11-12 | End: 2018-11-12 | Stop reason: HOSPADM

## 2018-11-12 RX ORDER — FENTANYL CITRATE 50 UG/ML
INJECTION, SOLUTION INTRAMUSCULAR; INTRAVENOUS AS NEEDED
Status: DISCONTINUED | OUTPATIENT
Start: 2018-11-12 | End: 2018-11-12 | Stop reason: HOSPADM

## 2018-11-12 RX ADMIN — HALOPERIDOL LACTATE 1 MG: 5 INJECTION, SOLUTION INTRAMUSCULAR at 09:53

## 2018-11-12 RX ADMIN — Medication 5 MG: at 08:34

## 2018-11-12 RX ADMIN — PROPOFOL 160 MG: 10 INJECTION, EMULSION INTRAVENOUS at 07:35

## 2018-11-12 RX ADMIN — DEXAMETHASONE SODIUM PHOSPHATE 4 MG: 4 INJECTION, SOLUTION INTRA-ARTICULAR; INTRALESIONAL; INTRAMUSCULAR; INTRAVENOUS; SOFT TISSUE at 07:45

## 2018-11-12 RX ADMIN — GLYCOPYRROLATE 1 MG: 0.2 INJECTION INTRAMUSCULAR; INTRAVENOUS at 08:34

## 2018-11-12 RX ADMIN — ONDANSETRON 4 MG: 2 INJECTION INTRAMUSCULAR; INTRAVENOUS at 08:29

## 2018-11-12 RX ADMIN — KETOROLAC TROMETHAMINE 30 MG: 30 INJECTION, SOLUTION INTRAMUSCULAR; INTRAVENOUS at 08:45

## 2018-11-12 RX ADMIN — FENTANYL CITRATE 100 MCG: 50 INJECTION, SOLUTION INTRAMUSCULAR; INTRAVENOUS at 07:35

## 2018-11-12 RX ADMIN — SODIUM CHLORIDE, SODIUM LACTATE, POTASSIUM CHLORIDE, AND CALCIUM CHLORIDE: 600; 310; 30; 20 INJECTION, SOLUTION INTRAVENOUS at 08:30

## 2018-11-12 RX ADMIN — LIDOCAINE HYDROCHLORIDE 40 MG: 20 INJECTION, SOLUTION EPIDURAL; INFILTRATION; INTRACAUDAL; PERINEURAL at 07:35

## 2018-11-12 RX ADMIN — ONDANSETRON 4 MG: 2 INJECTION INTRAMUSCULAR; INTRAVENOUS at 09:18

## 2018-11-12 RX ADMIN — EPHEDRINE SULFATE 10 MG: 50 INJECTION, SOLUTION INTRAVENOUS at 08:21

## 2018-11-12 RX ADMIN — ROCURONIUM BROMIDE 25 MG: 10 INJECTION, SOLUTION INTRAVENOUS at 07:50

## 2018-11-12 RX ADMIN — MIDAZOLAM HYDROCHLORIDE 2 MG: 1 INJECTION, SOLUTION INTRAMUSCULAR; INTRAVENOUS at 07:29

## 2018-11-12 RX ADMIN — INDOCYANINE GREEN AND WATER 3 MG: KIT at 06:45

## 2018-11-12 RX ADMIN — CEFOTETAN DISODIUM 2 G: 2 INJECTION, POWDER, FOR SOLUTION INTRAMUSCULAR; INTRAVENOUS at 07:32

## 2018-11-12 RX ADMIN — SUCCINYLCHOLINE CHLORIDE 120 MG: 20 INJECTION INTRAMUSCULAR; INTRAVENOUS at 07:35

## 2018-11-12 RX ADMIN — FAMOTIDINE 20 MG: 20 TABLET ORAL at 06:45

## 2018-11-12 RX ADMIN — FENTANYL CITRATE 50 MCG: 50 INJECTION, SOLUTION INTRAMUSCULAR; INTRAVENOUS at 08:30

## 2018-11-12 RX ADMIN — ROCURONIUM BROMIDE 5 MG: 10 INJECTION, SOLUTION INTRAVENOUS at 07:35

## 2018-11-12 RX ADMIN — SODIUM CHLORIDE, SODIUM LACTATE, POTASSIUM CHLORIDE, AND CALCIUM CHLORIDE 25 ML/HR: 600; 310; 30; 20 INJECTION, SOLUTION INTRAVENOUS at 06:45

## 2018-11-12 NOTE — ANESTHESIA POSTPROCEDURE EVALUATION
Procedure(s): 
ROBOTIC ASSISTED SINGLE SITE LAPAROSCOPIC CHOLECYSTECTOMY WITH FIREFLY. Anesthesia Post Evaluation Multimodal analgesia: multimodal analgesia used between 6 hours prior to anesthesia start to PACU discharge Patient location during evaluation: PACU Patient participation: complete - patient participated Level of consciousness: awake Pain score: 3 Pain management: satisfactory to patient Airway patency: patent Anesthetic complications: no 
Cardiovascular status: acceptable Respiratory status: acceptable Hydration status: acceptable Visit Vitals /68 Pulse 78 Temp 36.6 °C (97.9 °F) Resp 21 Ht 5' 3.25\" (1.607 m) Wt 63.9 kg (140 lb 12.8 oz) SpO2 100% BMI 24.74 kg/m²

## 2018-11-12 NOTE — DISCHARGE SUMMARY
4 Kelli Mccoy MD, [facility]  General Surgery  Discharge Summary     Patient ID:  Todd Collins  [id number]  89 y.o.  1970    Admit Date: 11/12/2018    Discharge Date: 11/12/2018    Admission Diagnoses: Chronic cholecystitis [K81.1]    Discharge Diagnoses:    Problem List as of 11/12/2018 Date Reviewed: 11/12/2018          Codes Class Noted - Resolved    Tobacco use ICD-10-CM: Z72.0  ICD-9-CM: 305.1  5/1/2017 - Present        Macrocytosis without anemia ICD-10-CM: D75.89  ICD-9-CM: 289.89  4/24/2017 - Present        Insomnia ICD-10-CM: G47.00  ICD-9-CM: 780.52  3/2/2011 - Present        RESOLVED: History of shingles ICD-10-CM: Z86.19  ICD-9-CM: V12.09  3/27/2015 - 9/19/2018        RESOLVED: Calcification of right breast ICD-10-CM: R92.1  ICD-9-CM: 793.89  Unknown - 5/1/2017               Admission Condition: Good    Discharge Condition: Good    Last Procedure: Procedure(s):  ROBOTIC ASSISTED SINGLE SITE [facility] Course:   Normal hospital course for this procedure. Consults: None    Significant Diagnostic Studies: None    Disposition: home    Patient Instructions:   Current Discharge Medication List      CONTINUE these medications which have NOT CHANGED    Details   diphenhydrAMINE (BENADRYL ALLERGY) 25 mg tablet Take 25 mg by mouth every six (6) hours as needed for Sleep.      pantoprazole (PROTONIX) 40 mg tablet TAKE 1 TABLET BY MOUTH DAILY  Qty: 30 Tab, Refills: 0      levoFLOXacin (LEVAQUIN) 500 mg tablet Take 1 Tab by mouth daily. Indications: acute cholecystitis  Qty: 14 Tab, Refills: 1      metroNIDAZOLE (FLAGYL) 500 mg tablet Take 1 Tab by mouth three (3) times daily.  Indications: acute cholecystitis  Qty: 42 Tab, Refills: 1      metroNIDAZOLE (METROGEL) 0.75 % vaginal gel INSERT 1 APPLICATORFUL INTO VAGINA TWICE WEEKLY  Qty: [id number] mg, Refills: 3      buPROPion XL (WELLBUTRIN XL) 150 mg tablet TAKE 1 TABLET BY MOUTH EVERY MORNING  Qty: 90 Tab, Refills: 1    Associated Diagnoses: Tobacco use      ondansetron (ZOFRAN ODT) 4 mg disintegrating tablet Take 1 Tab by mouth every eight (8) hours as needed for Nausea. Qty: 30 Tab, Refills: 0      acetaminophen (TYLENOL) 325 mg tablet Take  by mouth every four (4) hours as needed. CETIRIZINE HCL/PSEUDOEPHEDRINE (ZYRTEC-D PO) Take  by mouth as needed. ASPIRIN/ACETAMINOPHEN/CAFFEINE (EXCEDRIN MIGRAINE PO) Take  by mouth. ibuprofen (MOTRIN) 600 mg tablet Take  by mouth every six (6) hours as needed. Activity: See surgical instructions  Diet: Low-fat diet  Wound Care: As directed    Follow-up with Dr. Vinay Arias in 2 weeks.   Follow-up tests/labs none    Signed:  Rubina Fuller MD  11/12/2018  9:26 AM

## 2018-11-12 NOTE — INTERVAL H&P NOTE
H&P Update: 
Burleigh Collet was seen and examined. History and physical has been reviewed. The patient has been examined.  There have been no significant clinical changes since the completion of the originally dated History and Physical. 
 
Signed By: Raghavendra Harris MD   
 November 12, 2018 9:03 AM

## 2018-11-12 NOTE — ANESTHESIA PREPROCEDURE EVALUATION
Anesthetic History Review of Systems / Medical History Patient summary reviewed and pertinent labs reviewed Pulmonary Smoker Neuro/Psych Within defined limits Cardiovascular Exercise tolerance: >4 METS 
  
GI/Hepatic/Renal 
  
GERD: well controlled Endo/Other Within defined limits Other Findings Physical Exam 
 
Airway Mallampati: II 
TM Distance: 4 - 6 cm Neck ROM: normal range of motion Mouth opening: Normal 
 
 Cardiovascular Regular rate and rhythm,  S1 and S2 normal,  no murmur, click, rub, or gallop Dental 
No notable dental hx Pulmonary Breath sounds clear to auscultation Abdominal 
GI exam deferred Other Findings Anesthetic Plan ASA: 2 Anesthesia type: general 
 
 
 
 
Induction: Intravenous Anesthetic plan and risks discussed with: Patient

## 2018-11-12 NOTE — H&P
[facility] General Surgery 
  
  
Impression:  
  
·    Patient with chronic cholecystitis. 
  
Plan:  
  
· Single site robot-assisted laparoscopic cholecystectomy · Consent on chart · Preoperative orders written 
  
Subjective:  
  
HPI: Patient is a very pleasant 45-year-old female with a past medical history remarkable for headaches, tobacco usage and shingles.  She is referred by Dr. Tami Bravo for evaluation and management of biliary colic. Zuleyka Moore gives a history of 6 months of gastroesophageal reflux disease for which she takes Zantac over-the-counter.  Over the past 3 months the reflux has gone from the intermittent to constant with everything that she eats and drinks including water. Zuleyka Moore experiences right upper quadrant pain with eating greasy foods.  She admits that she does not eat 5 servings of fruits and vegetables per day and has some room for improvement in her diet. Zuleyka Moore has had nausea accompanying the pain and the pain does sometimes radiate into the right shoulder area. 
  
       
Patient Active Problem List  
  Diagnosis Date Noted  Tobacco use 05/01/2017  Macrocytosis without anemia 04/24/2017  Insomnia 03/02/2011  
  
       
Past Medical History:  
Diagnosis Date  Calcification of right breast    
 Gyn exam    
  Dr. Dave Black  [id number](306.6)    
 Shingles    
  
         
Past Surgical History:  
Procedure Laterality Date  HX ADENOIDECTOMY      
 HX ORTHOPAEDIC      
 HX TONSILLECTOMY      
 HX TUBAL LIGATION      
  
         
Family History Problem Relation Age of Onset  Breast Cancer Maternal Aunt 64  Breast Cancer Paternal Grandmother 79  
  
           
Social History Substance Use Topics  Smoking status: Current Every Day Smoker  
    Packs/day: 0.50  
    Years: 23.00  Smokeless tobacco: Never Used  Alcohol use Yes    
  
       
Allergies Allergen Reactions  Codeine Itching  Compazine [Prochlorperazine] Anaphylaxis  Morphine Nausea and Vomiting  Phenergan [Promethazine] Anaphylaxis  
  
             
Prior to Admission medications Medication Sig Start Date End Date Taking? Authorizing Provider  
metroNIDAZOLE (METROGEL) 0.75 % vaginal gel INSERT 1 APPLICATORFUL INTO VAGINA TWICE WEEKLY 9/19/18   Yes Calixto Reyes MD  
buPROPion XL (WELLBUTRIN XL) 150 mg tablet TAKE 1 TABLET BY MOUTH EVERY MORNING 9/19/18   Yes Calixto Reyes MD  
ondansetron (ZOFRAN ODT) 4 mg disintegrating tablet Take 1 Tab by mouth every eight (8) hours as needed for Nausea. 9/19/18   Yes Calixto Reyes MD  
CETIRIZINE HCL/PSEUDOEPHEDRINE (ZYRTEC-D PO) Take  by mouth.     Yes Historical Provider ASPIRIN/ACETAMINOPHEN/CAFFEINE (EXCEDRIN MIGRAINE PO) Take  by mouth.     Yes Historical Provider  
ibuprofen (MOTRIN) 600 mg tablet Take  by mouth every six (6) hours as needed.     Yes Historical Provider  
acetaminophen (TYLENOL) 325 mg tablet Take  by mouth every four (4) hours as needed.       Historical Provider  
  
  
Review of Systems:   
14 systems were reviewed. Trevor Ramsey results are as above in the HPI and otherwise negative.  
  
Objective:  
  
     
Vitals:  
  10/03/18 6679 BP: 105/58 Pulse: 84 Resp: 18 Temp: 97.6 °F (36.4 °C) TempSrc: Oral  
SpO2: 99% Weight: 67.6 kg (149 lb) Height: 5' 3.25\" (1.607 m)  
  
  
Physical Exam: 
GENERAL: alert, cooperative, no distress, appears stated age, EYE: conjunctivae/corneas clear. PERRL, EOM's intact. THROAT & NECK: normal and no erythema or exudates noted. ,   
LYMPHATIC: Cervical, supraclavicular, and axillary nodes normal. ,  
LUNG: clear to auscultation bilaterally, HEART: regular rate and rhythm, S1, S2 normal, no murmur, click, rub or gallop, ABDOMEN: soft, non-distended, right upper quadrant tenderness with positive Biggs sign.  Bowel sounds normal. No masses,  no organomegaly,  
 EXTREMITIES:  extremities normal, atraumatic, no cyanosis or edema, SKIN: Normal., NEUROLOGIC: AOx3. Cranial nerves 2-12 and sensation grossly intact. ,  
  
Data Review:  to be done 
  
Ms. Ryan has a reminder for a \"due or due soon\" health maintenance.  I have asked that she contact her primary care provider for follow-up on this health maintenance.

## 2018-11-12 NOTE — OP NOTES
[place] [address] [address]                                 OPERATIVE REPORT    PATIENT:    Pedro Rodrigues  MRN:           [id number]   DATE:   2018  [place]  ROOM:       PACU/  ATTENDING:   Reza Mittal MD  DICTATING:   Reza Mittal MD      PREOPERATIVE DIAGNOSIS:  Chronic calculus cholecystitis    POSTOPERATIVE DIAGNOSIS: Same    PROCEDURES PERFORMED: Single site robotic assisted laparoscopic cholecystectomy    SURGEON: Ángel Brar MD    ASSISTANT: Mali Cisneros SA    ANESTHESIA: General and local 0.25% Marcaine plain. FINDINGS: Chronic calculus cholecystitis    DESCRIPTION OF PROCEDURE: The patient was identified in the  holding area where consent for single site robot-assisted  laparoscopic cholecystectomy was verified. In the operating room, the  patient was placed under general anesthesia, supine on the OR table. The abdomen was prepped and draped in sterile fashion using  chlorhexidine solution and sterile drapes. The time-out was performed  to ensure correct procedure. Local anesthetic was infiltrated into the  skin and deep dermal tissues in the umbilicus. The 2.5 cm incision was  created through the umbilicus using the 11 blade. The electrocautery  was used to dissect down to expose the fascia. The fascia was opened  using electrocautery. The peritoneum was bluntly opened using the  Meg Bold clamp. The fascia was opened for the 2.5 cm length of the  incision. The single site trocar was inserted into the incision and seated  against the anterior abdominal wall. The pneumoperitoneum was  obtained using carbon dioxide gas to 15 mmHg. The camera trocar  was then inserted. The site check was then performed to ensure that  this was a surgery that we could do with single site. No  contraindications were visualized.  The endoscopic left trocar was then  inserted and docked. The endoscopic right trocar was then inserted  and docked. The assistant trocar was inserted. The system was  burped to take pressure off of the trocar. The scope was turned to a 30  down. The hook was inserted into the endoscopic left trocar #3 and  the Prograf was introduced into the endoscopic right trocar #1. The  patient had been positioned in reverse Trendelenburg with the right  side up prior to docking the camera. The gallbladder was grasped and  retracted toward the patient's right shoulder to open the triangle of  Calot. The crocodile grasper and the hook were used to dissect the  fatty tissue off of the anterior wall of the gallbladder and allow  visualization of the infundibulum cystic duct junction. Indocyanine  green had been given preoperatively. A Firefly was used to visualize  the cystic duct. The cystic duct was dissected free circumferentially  and clipped and ligated using scissors and the medium-size clips. The  cystic artery was then identified, isolated and clipped and ligated. The  gallbladder was removed from the gallbladder fossa using  electrocautery. No leaking was noted at the clips. No bleeding was  noted at the gallbladder fossa or artery site. The surgical cart was  undocked. The port was removed. The gallbladder was held by the  surgical assistant with a laparoscopic clamp. It was removed with the  single site trocar. The fascia was then reapproximated using figure-of-  eight stitches. #1 single-stranded PDS x3. The skin was  reapproximated using 4-0 Monocryl suture and Dermabond was used  as a wound sealant. The patient tolerated the procedure very well. DISPOSITION: She was extubated and stable upon transport to the recovery  room.       Rafa Staples MD, FACS

## 2018-11-12 NOTE — OP NOTES
[facility]  General Surgery       Impression:      ·    Patient with chronic cholecystitis.     Plan:      · Single site robot-assisted laparoscopic cholecystectomy  · Consent on chart  · Preoperative orders written    Subjective:      HPI: Patient is a very pleasant 81-year-old female with a past medical history remarkable for headaches, tobacco usage and shingles. She is referred by Dr. Claudia Silva for evaluation and management of biliary colic. She gives a history of 6 months of gastroesophageal reflux disease for which she takes Zantac over-the-counter. Over the past 3 months the reflux has gone from the intermittent to constant with everything that she eats and drinks including water. She experiences right upper quadrant pain with eating greasy foods. She admits that she does not eat 5 servings of fruits and vegetables per day and has some room for improvement in her diet.   She has had nausea accompanying the pain and the pain does sometimes radiate into the right shoulder area.          Patient Active Problem List     Diagnosis Date Noted    Tobacco use 05/01/2017    Macrocytosis without anemia 04/24/2017    Insomnia 03/02/2011           Past Medical History:   Diagnosis Date    Calcification of right breast      Gyn exam       Dr. German Millan [id number](686.6)      Shingles              Past Surgical History:   Procedure Laterality Date    HX ADENOIDECTOMY        HX ORTHOPAEDIC        HX TONSILLECTOMY        HX TUBAL LIGATION                Family History   Problem Relation Age of Onset    Breast Cancer Maternal Aunt 64    Breast Cancer Paternal Grandmother 79             Social History   Substance Use Topics    Smoking status: Current Every Day Smoker       Packs/day: 0.50       Years: 23.00    Smokeless tobacco: Never Used    Alcohol use Yes            Allergies   Allergen Reactions    Codeine Itching    Compazine [Prochlorperazine] Anaphylaxis    Morphine Nausea and Vomiting    Phenergan [Promethazine] Anaphylaxis               Prior to Admission medications    Medication Sig Start Date End Date Taking? Authorizing Provider   metroNIDAZOLE (METROGEL) 0.75 % vaginal gel INSERT 1 APPLICATORFUL INTO VAGINA TWICE WEEKLY 9/19/18   Yes Shaye Sommer MD   buPROPion XL (WELLBUTRIN XL) 150 mg tablet TAKE 1 TABLET BY MOUTH EVERY MORNING 9/19/18   Yes Shaye Sommer MD   ondansetron (ZOFRAN ODT) 4 mg disintegrating tablet Take 1 Tab by mouth every eight (8) hours as needed for Nausea. 9/19/18   Yes Shaye Sommer MD   CETIRIZINE HCL/PSEUDOEPHEDRINE (ZYRTEC-D PO) Take  by mouth.     Yes Historical Provider   ASPIRIN/ACETAMINOPHEN/CAFFEINE (EXCEDRIN MIGRAINE PO) Take  by mouth.     Yes Historical Provider   ibuprofen (MOTRIN) 600 mg tablet Take  by mouth every six (6) hours as needed.     Yes Historical Provider   acetaminophen (TYLENOL) 325 mg tablet Take  by mouth every four (4) hours as needed.       Historical Provider         Review of Systems:    14 systems were reviewed. The results are as above in the HPI and otherwise negative.      Objective:          Vitals:     10/03/18 0853   BP: 105/58   Pulse: 84   Resp: 18   Temp: 97.6 °F (36.4 °C)   TempSrc: Oral   SpO2: 99%   Weight: 67.6 kg (149 lb)   Height: 5' 3.25\" (1.607 m)         Physical Exam:  GENERAL: alert, cooperative, no distress, appears stated age,   EYE: conjunctivae/corneas clear. PERRL, EOM's intact. THROAT & NECK: normal and no erythema or exudates noted. ,    LYMPHATIC: Cervical, supraclavicular, and axillary nodes normal. ,   LUNG: clear to auscultation bilaterally,   HEART: regular rate and rhythm, S1, S2 normal, no murmur, click, rub or gallop,   ABDOMEN: soft, non-distended, right upper quadrant tenderness with positive Biggs sign. Bowel sounds normal. No masses,  no organomegaly,   EXTREMITIES:  extremities normal, atraumatic, no cyanosis or edema,   SKIN: Normal.,   NEUROLOGIC: AOx3.  Cranial nerves 2-12 and sensation grossly intact. ,      Data Review:  to be done     Ms. Hardeep Leblanc has a reminder for a \"due or due soon\" health maintenance. I have asked that she contact her primary care provider for follow-up on this health maintenance.

## 2018-11-12 NOTE — DISCHARGE INSTRUCTIONS
[facility] Specialists  Bhavna Hurtado MD, [facility]  General Surgery    Pt may remove the dressing and shower in two days. Allow soap and water to run over the incision. Please apply an ice pack to the umbilical region for 25 minutes 3 times daily for the first 7 days. No driving or operating heavy machinery while on narcotic pain medications. No strenuous activity or contact sports for two weeks. No lifting greater than 15 lbs for 2 weeks. Call MD for any redness, swelling, bleeding or pus at the incision. Also call for any nausea, vomiting, increased pain or pain uncontrolled by pain medicine. Low-Fat Diet for Gallbladder Disease: After Your Visit  Your Care Instructions  When you eat, the gallbladder releases bile, which helps you digest the fat in food. If you have an inflamed gallbladder, this may cause pain. A low-fat diet may give your gallbladder a rest so you can start to heal. Your doctor and dietitian can help you make an eating plan that does not irritate your digestive system. Always talk with your doctor or dietitian before you make changes in your diet. Follow-up care is a key part of your treatment and safety. Be sure to make and go to all appointments, and call your doctor if you are having problems. Its also a good idea to know your test results and keep a list of the medicines you take. How can you care for yourself at home? · Eat many small meals and snacks each day instead of three large meals. · Choose lean meats. ¨ Eat no more than 5 to 6½ ounces of meat a day. ¨ Cut off all fat you can see. ¨ Eat chicken and turkey without the skin. ¨ Many types of fish, such as salmon, lake trout, tuna, and herring, provide healthy omega-3 fat. But, avoid fish canned in oil, such as sardines in olive oil. ¨ Bake, broil, or grill meats, fowl, or fish instead of frying them in butter or fat.   · Drink or eat nonfat or low-fat milk, yogurt, cheese, or other milk products each day.  ¨ Read the labels on cheeses, and choose those with less than 5 grams of fat an ounce. ¨ Try fat-free sour cream, cream cheese, or yogurt. ¨ Avoid cream soups and cream sauces on pasta. ¨ Eat low-fat ice cream, frozen yogurt, or sorbet. Avoid regular ice cream.  · Eat whole-grain cereals, breads, crackers, rice, or pasta. Avoid high-fat foods such as croissants, scones, biscuits, waffles, doughnuts, muffins, granola, and high-fat breads. · Flavor your foods with herbs and spices (such as basil, tarragon, or mint), fat-free sauces, or lemon juice instead of butter. You can also use butter substitutes, fat-free mayonnaise, or fat-free dressing. · Try applesauce, prune puree, or mashed bananas to replace some or all of the fat when you bake. · Limit fats and oils, such as butter, margarine, mayonnaise, and salad dressing, to no more than 1 tablespoon a meal.  · Avoid high-fat foods, such as:  ¨ Chocolate, whole milk, ice cream, processed cheese, and egg yolks. ¨ Fried or buttered foods. ¨ Ham, salami, and calderon. ¨ Cinnamon rolls, cakes, pies, cookies, and other pastries. ¨ Prepared snack foods, such as potato chips, nut and granola bars, and mixed nuts. ¨ Coconut and avocado. · Learn how to read food labels for serving sizes and ingredients. Fast-food and convenience-food meals often have lots of fat. Where can you learn more? Go to Alo7.be  Enter G878 in the search box to learn more about \"Low-Fat Diet for Gallbladder Disease: After Your Visit. \"   © 2668-4959 [employer]. Care instructions adapted under license by [employer] (which disclaims liability or warranty for this information).  This care instruction is for use with your licensed healthcare professional. If you have questions about a medical condition or this instruction, always ask your healthcare professional. [place] [address] any warranty or liability for your use of this information. Content Version: 6.6.084767; Last Revised: August 31, 2011         Learning About Cholecystectomy  What is cholecystectomy  Cholecystectomy (say \"koh-teo-rico-MERRICK-ayan-kait\") is surgery to remove the gallbladder and gallstones. The gallbladder stores bile made by your liver. The bile helps you digest fats. Gallstones are made of cholesterol and other things found in bile. Your body will work fine without a gallbladder. Bile will go straight from the liver to the intestine. There may be small changes in how you digest food. But you probably will not notice them. How is the surgery done? Cholecystectomy is usually laparoscopic surgery. To do this type of surgery, a doctor puts a lighted tube, or scope, and other surgical tools through small cuts (incisions) in your belly. The doctor is able to see your organs with the scope. After your gallbladder is removed, you will no longer have gallstones. The cuts leave scars that usually fade with time. Open surgery may be done if problems are found during laparoscopic surgery. With open surgery, the gallbladder is removed through one larger cut in your belly. And the hospital stay is longer. What can you expect after surgery? It is normal to feel weak and tired for several days after you return home. Your belly may be swollen. If you had laparoscopic surgery, you may also have pain in your shoulder for about 24 hours. You may have gas or need to burp a lot at first.  A few people get diarrhea. The diarrhea usually goes away in 2 to 4 weeks. But it may last longer. How quickly you get better depends on which kind of surgery you had. For laparoscopic surgery, most people can go back to work or their normal routine in 1 to 2 weeks. It depends on the type of work you do and how you feel. If you have open surgery, it will probably take 4 to 6 weeks before you get back to your normal routine. Follow-up care is a key part of your treatment and safety.  Be sure to make and go to all appointments, and call your doctor if you are having problems. It's also a good idea to know your test results and keep a list of the medicines you take. Where can you learn more? Go to http://sneha-carlota.info/. Enter N358 in the search box to learn more about \"Learning About Cholecystectomy. \"  Current as of: March 28, 2018  Content Version: 11.8  © 1268-0000 [employer]. Care instructions adapted under license by [employer] (which disclaims liability or warranty for this information). If you have questions about a medical condition or this instruction, always ask your healthcare professional. [address] any warranty or liability for your use of this information. Acute Pain After Surgery: Care Instructions  Your Care Instructions    It's common to have some pain after surgery. Pain doesn't mean that something is wrong or that the surgery didn't go well. But when the pain is severe, it's important to work with your doctor to manage it. It's also important to be aware of a few facts about pain and pain medicine. · You are the only person who knows what your pain feels like. So be sure to tell your doctor when you are in pain or when the pain changes. Then he or she will know how to adjust your medicines. · Pain is often easier to control right after it starts. So it may be better to take regular doses of pain medicine and not wait until the pain gets bad. · Medicine can help control pain. But this doesn't mean you'll have no pain. Medicine works to keep the pain at a level you can live with. With time, you will feel better. Follow-up care is a key part of your treatment and safety. Be sure to make and go to all appointments, and call your doctor if you are having problems. It's also a good idea to know your test results and keep a list of the medicines you take. How can you care for yourself at home?   · Be safe with medicines. Read and follow all instructions on the label. ? If the doctor gave you a prescription medicine for pain, take it as prescribed. ? If you are not taking a prescription pain medicine, ask your doctor if you can take an over-the-counter medicine. · If you take an over-the-counter pain medicine, such as acetaminophen (Tylenol), ibuprofen (Advil, Motrin), or naproxen (Aleve), read and follow all instructions on the label. · Do not take two or more pain medicines at the same time unless the doctor told you to. · Do not drink alcohol while you are taking pain medicines. · Try to walk each day if your doctor recommends it. Start by walking a little more than you did the day before. Bit by bit, increase the amount you walk. Walking increases blood flow. It also helps prevent pneumonia and constipation. · To prevent constipation from opioid pain medicines:  ? Talk to your doctor about a laxative. ? Include fruits, vegetables, beans, and whole grains in your diet each day. These foods are high in fiber. ? Drink plenty of fluids, enough so that your urine is light yellow or clear like water. Drink water, fruit juice, or other drinks that do not contain caffeine or alcohol. If you have kidney, heart, or liver disease and have to limit fluids, talk with your doctor before you increase the amount of fluids you drink. ? Take a fiber supplement, such as Citrucel or Metamucil, every day if needed. Read and follow all instructions on the label. If you take pain medicine for more than a few days, talk to your doctor before you take fiber. When should you call for help? Call your doctor now or seek immediate medical care if:    · Your pain gets worse.     · Your pain is not controlled by medicine.    Watch closely for changes in your health, and be sure to contact your doctor if you have any problems. Where can you learn more? Go to http://sneha-carlota.info/.   Enter [phone] [phone] in the search box to learn more about \"Acute Pain After Surgery: Care Instructions. \"  Current as of: November 20, 2017  Content Version: 11.8  © 0537-6296 [employer]. Care instructions adapted under license by [employer] (which disclaims liability or warranty for this information). If you have questions about a medical condition or this instruction, always ask your healthcare professional. [address] any warranty or liability for your use of this information. DISCHARGE SUMMARY from Nurse    PATIENT INSTRUCTIONS:    After general anesthesia or intravenous sedation, for 24 hours or while taking prescription Narcotics:  · Limit your activities  · Do not drive and operate hazardous machinery  · Do not make important personal or business decisions  · Do  not drink alcoholic beverages  · If you have not urinated within 8 hours after discharge, please contact your surgeon on call. Report the following to your surgeon:  · Excessive pain, swelling, redness or odor of or around the surgical area  · Temperature over 100.5  · Nausea and vomiting lasting longer than 4 hours or if unable to take medications  · Any signs of decreased circulation or nerve impairment to extremity: change in color, persistent  numbness, tingling, coldness or increase pain  · Any questions    *  Please give a list of your current medications to your Primary Care Provider. *  Please update this list whenever your medications are discontinued, doses are      changed, or new medications (including over-the-counter products) are added. *  Please carry medication information at all times in case of emergency situations. These are general instructions for a healthy lifestyle:    No smoking/ No tobacco products/ Avoid exposure to second hand smoke  Surgeon General's Warning:  Quitting smoking now greatly reduces serious risk to your health.     Obesity, smoking, and sedentary lifestyle greatly increases your risk for illness    A healthy diet, regular physical exercise & weight monitoring are important for maintaining a healthy lifestyle    You may be retaining fluid if you have a history of heart failure or if you experience any of the following symptoms:  Weight gain of 3 pounds or more overnight or 5 pounds in a week, increased swelling in our hands or feet or shortness of breath while lying flat in bed. Please call your doctor as soon as you notice any of these symptoms; do not wait until your next office visit. Recognize signs and symptoms of STROKE:    F-face looks uneven    A-arms unable to move or move unevenly    S-speech slurred or non-existent    T-time-call 911 as soon as signs and symptoms begin-DO NOT go       Back to bed or wait to see if you get better-TIME IS BRAIN. Warning Signs of HEART ATTACK     Call 911 if you have these symptoms:   Chest discomfort. Most heart attacks involve discomfort in the center of the chest that lasts more than a few minutes, or that goes away and comes back. It can feel like uncomfortable pressure, squeezing, fullness, or pain.  Discomfort in other areas of the upper body. Symptoms can include pain or discomfort in one or both arms, the back, neck, jaw, or stomach.  Shortness of breath with or without chest discomfort.  Other signs may include breaking out in a cold sweat, nausea, or lightheadedness. Don't wait more than five minutes to call 911 - MINUTES MATTER! Fast action can save your life. Calling 911 is almost always the fastest way to get lifesaving treatment. Emergency Medical Services staff can begin treatment when they arrive -- up to an hour sooner than if someone gets to the hospital by car. The discharge information has been reviewed with the patient and parent. The patient and parent verbalized understanding.   Discharge medications reviewed with the patient and appropriate educational materials and side effects teaching were provided.   ___________________________________________________________________________________________________________________________________

## 2018-11-26 ENCOUNTER — OFFICE VISIT (OUTPATIENT)
Dept: SURGERY | Age: 48
End: 2018-11-26

## 2018-11-26 VITALS
RESPIRATION RATE: 18 BRPM | SYSTOLIC BLOOD PRESSURE: 96 MMHG | HEART RATE: 92 BPM | TEMPERATURE: 99 F | DIASTOLIC BLOOD PRESSURE: 68 MMHG | BODY MASS INDEX: 24.96 KG/M2 | WEIGHT: 142 LBS

## 2018-11-26 DIAGNOSIS — Z09 POSTOPERATIVE EXAMINATION: Primary | ICD-10-CM

## 2018-11-26 NOTE — PROGRESS NOTES
Greg Dumont. RAEANN [place]  PROGRESS NOTE    Name: Pedro Rodrigues MRN: [id number]   : 1970 Hospital[facility]   Date: 2018 Admission Date: No admission date for patient encounter. Subjective:  Ms. Masha Dickson is a very pleasant 52 yo AA female who presents today 2 weeks out from single site, robot assisted, laparoscopic cholecystectomy done 2018. She is doing well overall. 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 She is using motrin as needed at this point. She denies fever, chills or drainage from her incision. She has been keeping bacitracin on her incision, but there is no need to continue. She understands that she can work under activity restrictions and I will gladly provide a work note. Objective:  Vitals:    18 1105   BP: 96/68   Pulse: 92   Resp: 18   Temp: 99 °F (37.2 °C)   Weight: 64.4 kg (142 lb)        Physical Exam:   General:  Well developed well nourished female in no apparent distress   Abdomen: abdomen is soft with no tenderness. No masses, organomegaly or   Guarding. Umbilical incision is C/D/I. No glue remains. No edema, no erythema, no drainage. Labs:  No results found for this or any previous visit (from the past 24 hour(s)). Current Medications:  Current Outpatient Medications   Medication Sig Dispense Refill    diphenhydrAMINE (BENADRYL ALLERGY) 25 mg tablet Take 25 mg by mouth every six (6) hours as needed for Sleep.  ibuprofen (MOTRIN) 800 mg tablet Take 1 Tab by mouth three (3) times daily as needed for Pain. 40 Tab 0    docusate sodium (COLACE) 100 mg capsule Take 1 Cap by mouth two (2) times a day for 90 days.  60 Cap 2    pantoprazole (PROTONIX) 40 mg tablet TAKE 1 TABLET BY MOUTH DAILY 30 Tab 0    metroNIDAZOLE (METROGEL) 0.75 % vaginal gel INSERT 1 APPLICATORFUL INTO VAGINA TWICE WEEKLY [id number] mg 3    buPROPion XL [facility] XL) 150 mg tablet TAKE 1 TABLET BY MOUTH EVERY MORNING 90 Tab 1    CETIRIZINE HCL/PSEUDOEPHEDRINE (ZYRTEC-D PO) Take  by mouth as needed.  ASPIRIN/ACETAMINOPHEN/CAFFEINE (EXCEDRIN MIGRAINE PO) Take  by mouth.  HYDROmorphone (DILAUDID) 4 mg tablet Take 1 Tab by mouth every four (4) hours as needed for Pain. Max Daily Amount: 24 mg. 40 Tab 0    ondansetron (ZOFRAN ODT) 4 mg disintegrating tablet Take 1 Tab by mouth every eight (8) hours as needed for Nausea. 30 Tab 0       Chart and notes reviewed. Data reviewed. I have evaluated and examined the patient. IMPRESSION:   · Patient doing very well 2 weeks out from single site, robot assisted, laparoscopic cholecystectomy . PLAN:/DISCUSION:   · Activity restrictions for another month; work note provided  · Keep incision clean and dry  · May trial protonix holiday  · Follow up as needed     Ms. Demi Rutledge has a reminder for a \"due or due soon\" health maintenance. I have asked that she contact her primary care provider for follow-up on this health maintenance. Donya Santos.  Sue Weber, KELLYP-C

## 2018-11-26 NOTE — LETTER
NOTIFICATION RETURN TO WORK / SCHOOL 
 
11/26/2018 11:38 AM 
 
Ms. Ty Wheat [address] [place] [address] To Whom It May Concern: 
 
Ty Wheat is currently under the care of Rigo Harman Dr. She will return to work on Monday, December 3, 2018 under light duty work restrictions to include no lifting greater than 15lbs, no bending, stretching or twisting. She will be able to resume regular duties on Monday, December 24, 2018 unless she experiences a complication, which will be addressed at that time. If there are questions or concerns please have the patient contact our office. Sincerely, Shell Gallagher NP

## 2018-11-26 NOTE — PATIENT INSTRUCTIONS
Learning About Eating More Fruits and Vegetables  What are some quick tips for eating more fruits and vegetables? We're all encouraged to eat more fruits and vegetables. Yet it can seem like one more chore on the daily to-do list. But you can add color and crunch to your meals--and lots of nutrition--with these quick tips. · Brighten up your breakfast.  ? Add sliced fruit or frozen berries to your yogurt, pancakes, or cereal.  ? Blend fresh or frozen fruit, veggies, and yogurt with a little fruit juice, and you've got a tasty smoothie. ? Make your scrambled eggs a gourmet treat by adding onions, celery, and bell peppers. ? Bake up some bran muffins with grated carrots added into the mix. · Make a livelier lunch. ? Jazz up tuna or chicken salad with apple chunks, celery, or grapes--or all of them! ? Add cucumbers, avocado slices, tomatoes, and lettuce to your sandwiches. ? Kick up the flavor of grilled cheese sandwiches with spinach and tomatoes. ? Puree some potatoes or squash to add to tomato soup. · Add delicious veggies to dinner. ? Give more color and taste to salads. Stir in red cabbage, carrots, and bell peppers. Top salads with dried cranberries or raisins. \"Frost\" your salad with orange sections or strawberries. ? Keep a bag or two of frozen vegetables ready to pull out of the freezer for a side dish. ? Spice up spaghetti and meatballs with mushrooms and bell peppers. ? Roast vegetables like cauliflower or squash in the oven with olive oil to bring out their flavor. ? Season your veggie dish with herbs like basil and renetta and a splash of lemon juice and olive oil. ? If you've got a main dish in the oven, stick in a potato to round out your meal.  · Grab some healthy snacks on the go. ? Scoop up an apple, banana, or plum for a quick snack. ? Cut up raw fruits and veggies to keep in your fridge. Grapes, oranges, carrots, and celery are great choices.  They'll be ready for a quick snack or an after-school treat. ? Dip raw vegetables in hummus or peanut butter. ? Keep dried fruit on hand for an easy \"take with you\" snack. · Make something sweet--and healthy. ? Try baked apples or pears topped with cinnamon and honey for a delicious dessert. ? Make chocolate chip cookies even better with grated carrots added to the mix. Where can you learn more? Go to http://sneha-carlota.info/. Enter F050 in the search box to learn more about \"Learning About Eating More Fruits and Vegetables. \"  Current as of: January 15, 2018  Content Version: 11.8  © 1653-7148 [employer]. Care instructions adapted under license by [employer] (which disclaims liability or warranty for this information). If you have questions about a medical condition or this instruction, always ask your healthcare professional. [address] any warranty or liability for your use of this information.

## 2019-07-03 DIAGNOSIS — Z72.0 TOBACCO USE: ICD-10-CM

## 2019-07-03 RX ORDER — BUPROPION HYDROCHLORIDE 150 MG/1
TABLET ORAL
Qty: 90 TAB | Refills: 0 | Status: SHIPPED | OUTPATIENT
Start: 2019-07-03

## 2019-07-03 NOTE — TELEPHONE ENCOUNTER
[facility] pharmacy is requesting a med refill of Bupropion  mg (24H)  Last filled: 9/19/18  Last visit: 9/19/18  Next appointment: none

## 2025-01-27 NOTE — TELEPHONE ENCOUNTER
Added.
Please call lab and add b12/folate
Is This A New Presentation, Or A Follow-Up?: Skin Lesion
How Severe Is Your Skin Lesion?: moderate
Has Your Skin Lesion Been Treated?: not been treated
Additional History: Pt presents with spot of concern on left cheek that appeared about 4 months ago, has gotten bigger, denies bleeding or scabbing. Also presents with

## (undated) DEVICE — DRAPE TOWEL: Brand: CONVERTORS

## (undated) DEVICE — ARM DRAPE

## (undated) DEVICE — PREP SKN CHLRAPRP 26ML TNT -- CONVERT TO ITEM 373320

## (undated) DEVICE — SOFT SILICONE HYDROCELLULAR SACRUM DRESSING WITH LOCK AWAY LAYER: Brand: ALLEVYN LIFE SACRUM (LARGE) PACK OF 10

## (undated) DEVICE — GLOVE SURG BIOGEL 8.0 STRL -- SKINSENSE

## (undated) DEVICE — KIT CLN UP BON SECOURS MARYV

## (undated) DEVICE — REM POLYHESIVE ADULT PATIENT RETURN ELECTRODE: Brand: VALLEYLAB

## (undated) DEVICE — MAYO STAND COVER: Brand: CONVERTORS

## (undated) DEVICE — SUTURE PDS II SZ 1 L36IN ABSRB VLT L36MM CT-1 1/2 CIR Z347H

## (undated) DEVICE — GAUZE,SPONGE,4"X4",12PLY,STERILE,LF,2'S: Brand: MEDLINE

## (undated) DEVICE — Device

## (undated) DEVICE — ENDOSCOPE PORT: Brand: SINGLE-SITE

## (undated) DEVICE — SUTURE VCRL SZ 0 L18IN ABSRB UD POLYGLACTIN 910 BRAID TIE J912G

## (undated) DEVICE — SMOKE EVACUATION PENCIL: Brand: VALLEYLAB

## (undated) DEVICE — 3M™ TEGADERM™ TRANSPARENT FILM DRESSING FRAME STYLE, 1626W, 4 IN X 4-3/4 IN (10 CM X 12 CM), 50/CT 4CT/CASE: Brand: 3M™ TEGADERM™

## (undated) DEVICE — TIP COVER ACCESSORY

## (undated) DEVICE — STERILE POLYISOPRENE POWDER-FREE SURGICAL GLOVES: Brand: PROTEXIS

## (undated) DEVICE — INTENDED FOR TISSUE SEPARATION, AND OTHER PROCEDURES THAT REQUIRE A SHARP SURGICAL BLADE TO PUNCTURE OR CUT.: Brand: BARD-PARKER ®  SAFETY SCALPED

## (undated) DEVICE — NEEDLE HYPO 25GA L1.5IN BVL ORIENTED ECLIPSE

## (undated) DEVICE — COLUMN DRAPE

## (undated) DEVICE — SOLUTION IV 1000ML 0.9% SOD CHL

## (undated) DEVICE — 3M™ BAIR PAWS FLEX™ WARMING GOWN, STANDARD, 20 PER CASE 81003: Brand: BAIR PAWS™

## (undated) DEVICE — CANNULA SEAL: Brand: SINGLE-SITE

## (undated) DEVICE — KENDALL SCD EXPRESS SLEEVES, KNEE LENGTH, MEDIUM: Brand: KENDALL SCD

## (undated) DEVICE — CARTRIDGE CLP LIG HEMLOK GRN --

## (undated) DEVICE — SUTURE MCRYL SZ 4-0 L27IN ABSRB UD L24MM PS-1 3/8 CIR PRIM Y935H

## (undated) DEVICE — SUTURE VCRL SZ 3-0 L27IN ABSRB UD L26MM SH 1/2 CIR J416H

## (undated) DEVICE — SYR 10ML LUER LOK 1/5ML GRAD --

## (undated) DEVICE — COVER LT HNDL BLU STRL -- MEDICHOICE